# Patient Record
Sex: FEMALE | Race: WHITE | NOT HISPANIC OR LATINO | URBAN - METROPOLITAN AREA
[De-identification: names, ages, dates, MRNs, and addresses within clinical notes are randomized per-mention and may not be internally consistent; named-entity substitution may affect disease eponyms.]

---

## 2020-06-21 ENCOUNTER — EMERGENCY (EMERGENCY)
Facility: HOSPITAL | Age: 60
LOS: 1 days | Discharge: HOME | End: 2020-06-21
Attending: EMERGENCY MEDICINE
Payer: MEDICAID

## 2020-06-21 VITALS
RESPIRATION RATE: 16 BRPM | DIASTOLIC BLOOD PRESSURE: 71 MMHG | TEMPERATURE: 99 F | SYSTOLIC BLOOD PRESSURE: 117 MMHG | WEIGHT: 117.95 LBS | OXYGEN SATURATION: 97 % | HEART RATE: 88 BPM

## 2020-06-21 PROCEDURE — 99283 EMERGENCY DEPT VISIT LOW MDM: CPT | Mod: 25

## 2020-06-21 PROCEDURE — 99283 EMERGENCY DEPT VISIT LOW MDM: CPT

## 2020-06-21 PROCEDURE — 96372 THER/PROPH/DIAG INJ SC/IM: CPT

## 2020-06-21 RX ORDER — KETOROLAC TROMETHAMINE 30 MG/ML
30 SYRINGE (ML) INJECTION ONCE
Refills: 0 | Status: DISCONTINUED | OUTPATIENT
Start: 2020-06-21 | End: 2020-06-21

## 2020-06-21 RX ADMIN — Medication 30 MILLIGRAM(S): at 21:13

## 2020-06-21 NOTE — ED ADULT TRIAGE NOTE - CHIEF COMPLAINT QUOTE
I have bl knee pain , unable to walk , I have rheumatoid arthritis and hepatitis c, glaucoma, cataracts

## 2020-06-21 NOTE — ED PROVIDER NOTE - OBJECTIVE STATEMENT
59 y/o F patient with a significant PMHx of Hepatitis C, RA, Chronic right knee pain, and no significant PSHx presents to the ED with worsening pain for x1 week. Patient reports ambulating with a walker. Pt requesting Morphine to help with the pain. Patient currently not on pain meds. Pt reports having 2 beers earlier today. Patient is not a candidate for RA meds due to liver disease. Pt denies weakness, fever, shortness of breath, and any other complaints. NKDA.

## 2020-06-21 NOTE — ED PROVIDER NOTE - CLINICAL SUMMARY MEDICAL DECISION MAKING FREE TEXT BOX
Patient with chronic right knee pain due to RA. Pt asking several times for narcotics medication, educated on risk and factors associated with narcotic meds. Will provide Toradol, hot packs, and reassess.

## 2020-06-21 NOTE — ED PROVIDER NOTE - PATIENT PORTAL LINK FT
You can access the FollowMyHealth Patient Portal offered by [facility] by registering at the following website: [email or web address]. By joining [facility]’s FollowMyHealth portal, you will also be able to view your health information using other applications (apps) compatible with our system.

## 2020-07-05 ENCOUNTER — INPATIENT (INPATIENT)
Facility: HOSPITAL | Age: 60
LOS: 0 days | Discharge: HOME | DRG: 894 | End: 2020-07-06
Attending: HOSPITALIST | Admitting: HOSPITALIST
Payer: MEDICAID

## 2020-07-05 VITALS
HEIGHT: 65 IN | WEIGHT: 160.06 LBS | SYSTOLIC BLOOD PRESSURE: 163 MMHG | OXYGEN SATURATION: 98 % | RESPIRATION RATE: 20 BRPM | HEART RATE: 105 BPM | DIASTOLIC BLOOD PRESSURE: 87 MMHG

## 2020-07-05 DIAGNOSIS — F10.239 ALCOHOL DEPENDENCE WITH WITHDRAWAL, UNSPECIFIED: ICD-10-CM

## 2020-07-05 LAB
ALBUMIN SERPL ELPH-MCNC: 2.9 G/DL — LOW (ref 3.5–5)
ALP SERPL-CCNC: 175 U/L — HIGH (ref 40–120)
ALT FLD-CCNC: 27 U/L DA — SIGNIFICANT CHANGE UP (ref 10–60)
ANION GAP SERPL CALC-SCNC: 13 MMOL/L — SIGNIFICANT CHANGE UP (ref 5–17)
APAP SERPL-MCNC: <2 UG/ML — LOW (ref 10–30)
APPEARANCE UR: CLEAR — SIGNIFICANT CHANGE UP
AST SERPL-CCNC: 75 U/L — HIGH (ref 10–40)
BASOPHILS # BLD AUTO: 0.03 K/UL — SIGNIFICANT CHANGE UP (ref 0–0.2)
BASOPHILS NFR BLD AUTO: 0.4 % — SIGNIFICANT CHANGE UP (ref 0–2)
BILIRUB SERPL-MCNC: 1.8 MG/DL — HIGH (ref 0.2–1.2)
BILIRUB UR-MCNC: NEGATIVE — SIGNIFICANT CHANGE UP
BUN SERPL-MCNC: 4 MG/DL — LOW (ref 7–18)
CALCIUM SERPL-MCNC: 8.5 MG/DL — SIGNIFICANT CHANGE UP (ref 8.4–10.5)
CHLORIDE SERPL-SCNC: 99 MMOL/L — SIGNIFICANT CHANGE UP (ref 96–108)
CO2 SERPL-SCNC: 23 MMOL/L — SIGNIFICANT CHANGE UP (ref 22–31)
COLOR SPEC: YELLOW — SIGNIFICANT CHANGE UP
CREAT SERPL-MCNC: 0.78 MG/DL — SIGNIFICANT CHANGE UP (ref 0.5–1.3)
DIFF PNL FLD: NEGATIVE — SIGNIFICANT CHANGE UP
EOSINOPHIL # BLD AUTO: 0.07 K/UL — SIGNIFICANT CHANGE UP (ref 0–0.5)
EOSINOPHIL NFR BLD AUTO: 0.9 % — SIGNIFICANT CHANGE UP (ref 0–6)
ETHANOL SERPL-MCNC: <3 MG/DL — SIGNIFICANT CHANGE UP (ref 0–10)
GLUCOSE SERPL-MCNC: 120 MG/DL — HIGH (ref 70–99)
GLUCOSE UR QL: NEGATIVE — SIGNIFICANT CHANGE UP
HCT VFR BLD CALC: 39.8 % — SIGNIFICANT CHANGE UP (ref 34.5–45)
HGB BLD-MCNC: 13.6 G/DL — SIGNIFICANT CHANGE UP (ref 11.5–15.5)
IMM GRANULOCYTES NFR BLD AUTO: 0.4 % — SIGNIFICANT CHANGE UP (ref 0–1.5)
KETONES UR-MCNC: NEGATIVE — SIGNIFICANT CHANGE UP
LEUKOCYTE ESTERASE UR-ACNC: NEGATIVE — SIGNIFICANT CHANGE UP
LYMPHOCYTES # BLD AUTO: 2.81 K/UL — SIGNIFICANT CHANGE UP (ref 1–3.3)
LYMPHOCYTES # BLD AUTO: 36.6 % — SIGNIFICANT CHANGE UP (ref 13–44)
MCHC RBC-ENTMCNC: 33.1 PG — SIGNIFICANT CHANGE UP (ref 27–34)
MCHC RBC-ENTMCNC: 34.2 GM/DL — SIGNIFICANT CHANGE UP (ref 32–36)
MCV RBC AUTO: 96.8 FL — SIGNIFICANT CHANGE UP (ref 80–100)
MONOCYTES # BLD AUTO: 0.75 K/UL — SIGNIFICANT CHANGE UP (ref 0–0.9)
MONOCYTES NFR BLD AUTO: 9.8 % — SIGNIFICANT CHANGE UP (ref 2–14)
NEUTROPHILS # BLD AUTO: 3.99 K/UL — SIGNIFICANT CHANGE UP (ref 1.8–7.4)
NEUTROPHILS NFR BLD AUTO: 51.9 % — SIGNIFICANT CHANGE UP (ref 43–77)
NITRITE UR-MCNC: NEGATIVE — SIGNIFICANT CHANGE UP
NRBC # BLD: 0 /100 WBCS — SIGNIFICANT CHANGE UP (ref 0–0)
PH UR: 6.5 — SIGNIFICANT CHANGE UP (ref 5–8)
PLATELET # BLD AUTO: 110 K/UL — LOW (ref 150–400)
POTASSIUM SERPL-MCNC: 3.2 MMOL/L — LOW (ref 3.5–5.3)
POTASSIUM SERPL-SCNC: 3.2 MMOL/L — LOW (ref 3.5–5.3)
PROT SERPL-MCNC: 7.3 G/DL — SIGNIFICANT CHANGE UP (ref 6–8.3)
PROT UR-MCNC: NEGATIVE — SIGNIFICANT CHANGE UP
RBC # BLD: 4.11 M/UL — SIGNIFICANT CHANGE UP (ref 3.8–5.2)
RBC # FLD: 13.2 % — SIGNIFICANT CHANGE UP (ref 10.3–14.5)
SALICYLATES SERPL-MCNC: <1.7 MG/DL — LOW (ref 2.8–20)
SODIUM SERPL-SCNC: 135 MMOL/L — SIGNIFICANT CHANGE UP (ref 135–145)
SP GR SPEC: 1 — LOW (ref 1.01–1.02)
TSH SERPL-MCNC: 2.42 UU/ML — SIGNIFICANT CHANGE UP (ref 0.34–4.82)
UROBILINOGEN FLD QL: NEGATIVE — SIGNIFICANT CHANGE UP
WBC # BLD: 7.68 K/UL — SIGNIFICANT CHANGE UP (ref 3.8–10.5)
WBC # FLD AUTO: 7.68 K/UL — SIGNIFICANT CHANGE UP (ref 3.8–10.5)

## 2020-07-05 PROCEDURE — 93010 ELECTROCARDIOGRAM REPORT: CPT

## 2020-07-05 PROCEDURE — 71045 X-RAY EXAM CHEST 1 VIEW: CPT | Mod: 26

## 2020-07-05 PROCEDURE — 99285 EMERGENCY DEPT VISIT HI MDM: CPT

## 2020-07-05 PROCEDURE — 99223 1ST HOSP IP/OBS HIGH 75: CPT

## 2020-07-05 PROCEDURE — 70450 CT HEAD/BRAIN W/O DYE: CPT | Mod: 26

## 2020-07-05 RX ORDER — ACETAMINOPHEN 500 MG
650 TABLET ORAL EVERY 12 HOURS
Refills: 0 | Status: DISCONTINUED | OUTPATIENT
Start: 2020-07-05 | End: 2020-07-06

## 2020-07-05 RX ORDER — ENOXAPARIN SODIUM 100 MG/ML
40 INJECTION SUBCUTANEOUS DAILY
Refills: 0 | Status: DISCONTINUED | OUTPATIENT
Start: 2020-07-05 | End: 2020-07-06

## 2020-07-05 RX ORDER — ALPRAZOLAM 0.25 MG
0.5 TABLET ORAL THREE TIMES A DAY
Refills: 0 | Status: DISCONTINUED | OUTPATIENT
Start: 2020-07-05 | End: 2020-07-06

## 2020-07-05 RX ADMIN — Medication 2 MILLIGRAM(S): at 19:56

## 2020-07-05 RX ADMIN — Medication 650 MILLIGRAM(S): at 23:12

## 2020-07-05 NOTE — ED ADULT TRIAGE NOTE - CHIEF COMPLAINT QUOTE
As per EMS:  called for seizure episode earlier.  History of alcohol abuse  Unknown exposure to corona virus or to any person with flu-symptoms

## 2020-07-05 NOTE — ED PROVIDER NOTE - OBJECTIVE STATEMENT
60 year old female with PMHx of hepatitis C, rheumatoid arthritis right knee pain, and ETOH abuse and no significant PSHx presents to the ED via EMS after having seizure-like activity at home earlier today. Patient was reported to have two episodes of seizure-like activity while at home, and EMS reports an additional episode which was witnessed by them. Patient was noted to have positive tongue biting during the episodes.         Patient's  Everardo, who I have spoken to over the phone, relates that patient tried to stop drinking yesterday, after which she had an episode of seizure-like activity around 13:30, and then another episode around 15:30 today.  reports that patient has no history of seizures in the past. Patient's  states that he has no suspicion for trauma or drug use.   Allergies: Penicillin (rash)

## 2020-07-05 NOTE — ED ADULT NURSE NOTE - OBJECTIVE STATEMENT
Pt. c/o seizure today witnessed by . As per EMS pt. drinks alcohol every day with  and as per  pt. had a seizure in bed today and one en route to the ED.

## 2020-07-05 NOTE — ED PROVIDER NOTE - NEUROLOGICAL, MLM
Alert and oriented, no focal deficits, no motor or sensory deficits. Muscle strength and sensation normal. Alert and oriented, no focal deficits, no motor or sensory deficits. Muscle strength and sensation intact.

## 2020-07-05 NOTE — H&P ADULT - HISTORY OF PRESENT ILLNESS
Patient is a 60 year old female, lives at home with , with PMHx of Migraines, HTN, EtOH abuse, anxiety, on Xanax, Hepatitis C(Treated with interferon for 2 years late 1990s), Precancerous lesions of cervix presented after 2 episodes of convulsions. First episode occurred early afternoon prior to admission, patient was outside in the sun when  noticed patient to have a convulsion with associated confusion, weakness. Patient was then brought inside where symptoms lingered. At approximately 6pm prior to arrival in hospital patient had another episode in which patient had another episode of convulsion, this time with blood coming out of mouth.     To note, patient drinks approximately 6 beers a day for many years, On Xanax that ran out recently. Patient's last drink approximately 24 hours prior to arrival at hospital. As per , patient has been increasing confused in recent history with behavioral change.    GoC: Full Code Patient is a 60 year old female, lives at home with , with PMHx of Migraines, HTN, EtOH abuse, anxiety, on Xanax, Hepatitis C(Treated with interferon for 2 years late 1990s), after 2 episodes of convulsions. First episode occurred early afternoon prior to admission, patient was outside in the sun when  noticed patient to have a convulsion with associated confusion, weakness. Patient was then brought inside where symptoms lingered. At approximately 6pm prior to arrival in hospital patient had another episode in which patient had another episode of convulsion, this time with blood coming out of mouth.     To note, patient drinks approximately 6 beers a day for many years, On Xanax that ran out recently. Patient's last drink approximately 24 hours prior to arrival at hospital. As per , patient has been increasing confused in recent history with behavioral change.    GoC: Full Code

## 2020-07-05 NOTE — H&P ADULT - PROBLEM SELECTOR PLAN 1
New onset seizure due to benzo vs alcohol withdrawal  - Will obtain neuro consult, obtain EEG  - Ativan as needed for withdrawal. Continue with Xanax 1mg po qdaily  - Hold on loading dose of anti-epileptic medications unless patient has another seizure  - [facility] protocol

## 2020-07-05 NOTE — H&P ADULT - PROBLEM SELECTOR PLAN 8
IMPROVE VTE score: 0  Will manage with: Lovenox S/C     [ ] Previous VTE                                    3  [ ] Thrombophilia                                  2  [] Lower limb paralysis                        2  (unable to hold up >15 seconds)    [ ] Current Cancer (within 6 months)        2   [] Immobilization > 24 hrs                    1  [ ] ICU/CCU stay > 24 hrs                      1  [] Age > 60                                         1

## 2020-07-05 NOTE — ED PROVIDER NOTE - CLINICAL SUMMARY MEDICAL DECISION MAKING FREE TEXT BOX
60 year old female with new onset seizures after attempting to stop drinking. Concerning for ETOH withdrawal seizures. Patient given Ativan. Will obtain CT head, do toxicology screen, and will admit to hospital.

## 2020-07-05 NOTE — H&P ADULT - PROBLEM SELECTOR PLAN 3
Multiple year history of daily alcohol consumption.  Concerns for withdrawal, will start on CIWA protocol, ativan prn  - Concerns for Wernicke Encephalopathy, will start on thiamine 250 IV BID for 2-5 days, folate and multivitamin  - Tele for 24 hours  -  consult

## 2020-07-05 NOTE — H&P ADULT - PROBLEM SELECTOR PLAN 4
Hx of Hepatitis C treated from [id number] with interferon.  Elevated liver enzymes, possible, lesslikely alcoholic hepatitis  Will obtain PT/INR, hepatitis C antibody, US RUQ  - F/U Maddrey score for discriminant function

## 2020-07-05 NOTE — H&P ADULT - PROBLEM SELECTOR PLAN 2
Multiple possible causes, post-ictal vs drug induced vs alcohol withdrawal, vs Wernicke Enceph.  CT head negative, EtOH levels negative.

## 2020-07-05 NOTE — H&P ADULT - ASSESSMENT
GEN: NAD, lethargic, sleeping, s.p ativan   HEENT- normocephalic; moist mucous membranes, non bleeding tongue laceration noted   CHEST- scattered telangiectasias, hyperpigmentation of skin.  CVS- S1S2+ regular, no murmurs  LUNGS- clear to auscultation bilaterally, no wheezes, rhonci. rales  ABD: Soft , nontender, nondistended/obese, Bowel sounds are present  EXTREMITY: no calf tenderness, no cyanosis, no edema  NEURO: AAOx3; non focal neurologic exam; cranial nerves grossly intact, no asterixis, tremor  PSYCH: appears anxious, sedated  BACK: no swelling or mass;   SKIN: warm and dry.

## 2020-07-05 NOTE — H&P ADULT - NSHPSOCIALHISTORY_GEN_ALL_CORE
1/2 pack a day many years, Drinks 6 pack a day sometimes more, Norco for migraines, Xanax for anxiety. Hx of overdose on Ambien

## 2020-07-05 NOTE — H&P ADULT - ATTENDING COMMENTS
Vital Signs Last 24 Hrs  T(C): 37.6 (05 Jul 2020 19:32), Max: 37.6 (05 Jul 2020 19:32)  T(F): 99.7 (05 Jul 2020 19:32), Max: 99.7 (05 Jul 2020 19:32)  HR: 105 (05 Jul 2020 19:23) (105 - 105)  BP: 163/87 (05 Jul 2020 19:23) (163/87 - 163/87)  BP(mean): --  RR: 20 (05 Jul 2020 19:23) (20 - 20)  SpO2: 98% (05 Jul 2020 19:23) (98% - 98%)    #: [id number] Patient seen and examined ; case was discussed with the admitting resident  Unable to assess fam hx due to mental status.   ROS: as in the HPI; all other ROS negative  SH and family history as above    Vital Signs Last 24 Hrs  T(C): 37.6 (2020 19:32), Max: 37.6 (2020 19:32)  T(F): 99.7 (2020 19:32), Max: 99.7 (2020 19:32)  HR: 105 (2020 19:23) (105 - 105)  BP: 163/87 (2020 19:23) (163/87 - 163/87)  BP(mean): --  RR: 20 (2020 19:23) (20 - 20)  SpO2: 98% (2020 19:23) (98% - 98%)    GEN: NAD  HEENT- normocephalic; mouth moist, non bleeding tongue laceration noted   CHEST- scattered telangiectasias   CVS- S1S2+  LUNGS- clear to auscultation; no wheezing  ABD: Soft , nontender, nondistended, Bowel sounds are present  EXTREMITY: no calf tenderness, no cyanosis, no edema  NEURO: AAOx3; non focal neurologic exam; cranial nerves grossly intact, no asterixis, fine tremor b/l with outstretched hands.   PSYCH: mildly sedated   BACK: no swelling or mass;   VASCULAR: ++ distal peripheral pulses  SKIN: warm and dry.                         13.6   7.68  )-----------( 110      ( 2020 19:46 )             39.8     07-05    135  |  99  |  4<L>  ----------------------------<  120<H>  3.2<L>   |  23  |  0.78    Ca    8.5      2020 19:46    TPro  7.3  /  Alb  2.9<L>  /  TBili  1.8<H>  /  DBili  x   /  AST  75<H>  /  ALT  27  /  AlkPhos  175<H>  07-05  Urinalysis Basic - ( 2020 22:13 )  Color: Yellow / Appearance: Clear / S.005 / pH: x  Gluc: x / Ketone: Negative  / Bili: Negative / Urobili: Negative   Blood: x / Protein: Negative / Nitrite: Negative   Leuk Esterase: Negative / RBC: x / WBC x   Sq Epi: x / Non Sq Epi: x / Bacteria: x  BNP:   MEDICATIONS  (STANDING):  MEDICATIONS  (PRN):  acetaminophen   Tablet .. 650 milliGRAM(s) Oral every 12 hours PRN Mild Pain (1 - 3)  CXR reviewed  EKG Reviewed    59 y/o F with HCV ?cirrhosis, newly diagnosed COPD not on inhalers, chronic migraines and anxiety on Xanax admitted with new onset witnessed seizure. Seizure witnessed with generalized tonic clonic movements and had another episode with EMS. Hx somewhat limited due to patient's mental status- medication effect vs postictal state. Alcohol consumption unclear, states she drinks every day 1-2 beers with last beer last night  but difficult to obtain further information about chronic consumption. Denies prev hx of seizures, hospitalization for EtOH. Of note ran out of Xanax 2 days ago.     1. New onset seizure- suspect due to BZD withdrawal, possible EtOH. Prev Rx for Xanax 1mg 23 days supply quantity 45 filled on 2020. Received ativan IV in ED, will continue Xanax for now 1mg daily as she is mildly sedated on exam, and add prn ativan for seizure like activity or elevated CIWA score. Will hold Keppra for now but give loading dose if has another seizure, routine neuro checks, routine EEG, CT head unremarkable. Appreciate neurology consultation.   2. Toxic metabolic encephalopathy- due to medication effect vs post ictal state, possible alcohol withdrawal- EtOH level negative, CT head negative as above.  3. Alcohol dependence- suspected with possible withdrawal- will attempt to obtain collateral information from . Consider DC tele if no events in 24 h, MVI, thiamine supp, SW consult for resources, CIWA monitoring.  4. Elevated hepatic enzymes- check PT to calculate DF, low clinical suspicion for alcoholic hepatitis, check HCV viral load, hepatic US   5. Chronic HCV- did ribavirn in the mid 1990s, denies prev bx. Recommended to f/u outpatient   6. Chronic BZD use   7. Hypokalemia- replace and recheck, check Mg   8. Chronic migraines- denies pain now, had Rx for Norco filled on 20, will hold given increased risk of rebound headaches on narcotics.   9. COPD- recently diagnosed, denies cough, sputum production or dyspnea. Needs to f/u outpatient for treatment as inhalers prescribed were not covered by insurance.   10. Tobacco abuse- counseled on cessation, patch offered and declines.       Plan of care discussed with patient ;  all questions and concerns were addressed.  Discussed with Patient's family  I stop reviewed #: [id number] Patient seen and examined ; case was discussed with the admitting resident  Unable to assess fam hx due to mental status.   ROS: as in the HPI; all other ROS negative  SH and family history as above    Vital Signs Last 24 Hrs  T(C): 37.6 (2020 19:32), Max: 37.6 (2020 19:32)  T(F): 99.7 (2020 19:32), Max: 99.7 (2020 19:32)  HR: 105 (2020 19:23) (105 - 105)  BP: 163/87 (2020 19:23) (163/87 - 163/87)  BP(mean): --  RR: 20 (2020 19:23) (20 - 20)  SpO2: 98% (2020 19:23) (98% - 98%)    GEN: NAD  HEENT- normocephalic; mouth moist, non bleeding tongue laceration noted   CHEST- scattered telangiectasias   CVS- S1S2+  LUNGS- clear to auscultation; no wheezing  ABD: Soft , nontender, nondistended, Bowel sounds are present  EXTREMITY: no calf tenderness, no cyanosis, no edema  NEURO: AAOx3; non focal neurologic exam; cranial nerves grossly intact, no asterixis, fine tremor b/l with outstretched hands.   PSYCH: mildly sedated   BACK: no swelling or mass;   VASCULAR: ++ distal peripheral pulses  SKIN: warm and dry.                         13.6   7.68  )-----------( 110      ( 2020 19:46 )             39.8     07-05    135  |  99  |  4<L>  ----------------------------<  120<H>  3.2<L>   |  23  |  0.78    Ca    8.5      2020 19:46    TPro  7.3  /  Alb  2.9<L>  /  TBili  1.8<H>  /  DBili  x   /  AST  75<H>  /  ALT  27  /  AlkPhos  175<H>  07-05  Urinalysis Basic - ( 2020 22:13 )  Color: Yellow / Appearance: Clear / S.005 / pH: x  Gluc: x / Ketone: Negative  / Bili: Negative / Urobili: Negative   Blood: x / Protein: Negative / Nitrite: Negative   Leuk Esterase: Negative / RBC: x / WBC x   Sq Epi: x / Non Sq Epi: x / Bacteria: x  BNP:   MEDICATIONS  (STANDING):  MEDICATIONS  (PRN):  acetaminophen   Tablet .. 650 milliGRAM(s) Oral every 12 hours PRN Mild Pain (1 - 3)  CXR reviewed  EKG Reviewed    61 y/o F with HCV ?cirrhosis, newly diagnosed COPD not on inhalers, chronic migraines and anxiety on Xanax admitted with new onset witnessed seizure. Seizure witnessed with generalized tonic clonic movements and had another episode with EMS. Hx somewhat limited due to patient's mental status- medication effect vs postictal state. Alcohol consumption unclear, states she drinks every day 1-2 beers with last beer last night  but difficult to obtain further information about chronic consumption. Denies prev hx of seizures, hospitalization for EtOH. Of note ran out of Xanax 2 days ago. Addendum- collateral information obtained from : patient has been drinking heavily for many years at least a six pack of beer a day.     1. New onset seizure- suspect due to BZD withdrawal, suspected EtOH. Prev Rx for Xanax 1mg 23 days supply quantity 45 filled on 2020. Received ativan IV in ED, will continue Xanax for now 1mg daily as she is mildly sedated on exam, and add prn ativan for seizure like activity or elevated CIWA score. Will hold Keppra for now but give loading dose if has another seizure, routine neuro checks, routine EEG, CT head unremarkable. Appreciate neurology consultation.   2. Toxic metabolic encephalopathy- due to medication effect vs post ictal state, possible alcohol withdrawal- EtOH level negative, CT head negative as above.  3. Alcohol dependence- suspected with possible withdrawal- will attempt to obtain collateral information from . Consider DC tele if no events in 24 h, MVI, thiamine supp, SW consult for resources, CIWA monitoring.  4. Elevated hepatic enzymes- check PT to calculate DF, low clinical suspicion for alcoholic hepatitis, check HCV viral load, hepatic US   5. Chronic HCV- did ribavirn in the mid , denies prev bx. Recommended to f/u outpatient   6. Chronic BZD use   7. Hypokalemia- replace and recheck, check Mg   8. Chronic migraines- denies pain now, had Rx for Norco filled on 20, will hold given increased risk of rebound headaches on narcotics.   9. COPD- recently diagnosed, denies cough, sputum production or dyspnea. Needs to f/u outpatient for treatment as inhalers prescribed were not covered by insurance.   10. Tobacco abuse- counseled on cessation, patch offered and declines.       Plan of care discussed with patient ;  all questions and concerns were addressed.  Discussed with Patient's family  I stop reviewed #: [id number]

## 2020-07-05 NOTE — ED ADULT NURSE NOTE - NSIMPLEMENTINTERV_GEN_ALL_ED
Implemented All Fall Risk Interventions:  [place] to call system. Call bell, personal items and telephone within reach. Instruct patient to call for assistance. Room bathroom lighting operational. Non-slip footwear when patient is off stretcher. Physically safe environment: no spills, clutter or unnecessary equipment. Stretcher in lowest position, wheels locked, appropriate side rails in place. Provide visual cue, wrist band, yellow gown, etc. Monitor gait and stability. Monitor for mental status changes and reorient to person, place, and time. Review medications for side effects contributing to fall risk. Reinforce activity limits and safety measures with patient and family.

## 2020-07-06 VITALS
HEART RATE: 88 BPM | RESPIRATION RATE: 19 BRPM | DIASTOLIC BLOOD PRESSURE: 69 MMHG | OXYGEN SATURATION: 97 % | SYSTOLIC BLOOD PRESSURE: 119 MMHG | TEMPERATURE: 99 F

## 2020-07-06 DIAGNOSIS — Z29.9 ENCOUNTER FOR PROPHYLACTIC MEASURES, UNSPECIFIED: ICD-10-CM

## 2020-07-06 DIAGNOSIS — Z02.9 ENCOUNTER FOR ADMINISTRATIVE EXAMINATIONS, UNSPECIFIED: ICD-10-CM

## 2020-07-06 DIAGNOSIS — G92 TOXIC ENCEPHALOPATHY: ICD-10-CM

## 2020-07-06 DIAGNOSIS — Z71.89 OTHER SPECIFIED COUNSELING: ICD-10-CM

## 2020-07-06 DIAGNOSIS — F13.20 SEDATIVE, HYPNOTIC OR ANXIOLYTIC DEPENDENCE, UNCOMPLICATED: ICD-10-CM

## 2020-07-06 DIAGNOSIS — F10.20 ALCOHOL DEPENDENCE, UNCOMPLICATED: ICD-10-CM

## 2020-07-06 DIAGNOSIS — R79.89 OTHER SPECIFIED ABNORMAL FINDINGS OF BLOOD CHEMISTRY: ICD-10-CM

## 2020-07-06 DIAGNOSIS — J44.9 CHRONIC OBSTRUCTIVE PULMONARY DISEASE, UNSPECIFIED: ICD-10-CM

## 2020-07-06 DIAGNOSIS — G43.909 MIGRAINE, UNSPECIFIED, NOT INTRACTABLE, WITHOUT STATUS MIGRAINOSUS: ICD-10-CM

## 2020-07-06 DIAGNOSIS — R56.9 UNSPECIFIED CONVULSIONS: ICD-10-CM

## 2020-07-06 DIAGNOSIS — B18.2 CHRONIC VIRAL HEPATITIS C: ICD-10-CM

## 2020-07-06 PROBLEM — M25.561 PAIN IN RIGHT KNEE: Chronic | Status: ACTIVE | Noted: 2020-06-21

## 2020-07-06 PROBLEM — M06.9 RHEUMATOID ARTHRITIS, UNSPECIFIED: Chronic | Status: ACTIVE | Noted: 2020-06-21

## 2020-07-06 PROBLEM — B19.20 UNSPECIFIED VIRAL HEPATITIS C WITHOUT HEPATIC COMA: Chronic | Status: ACTIVE | Noted: 2020-06-21

## 2020-07-06 LAB
24R-OH-CALCIDIOL SERPL-MCNC: 15.9 NG/ML — LOW (ref 30–80)
A1C WITH ESTIMATED AVERAGE GLUCOSE RESULT: 4.7 % — SIGNIFICANT CHANGE UP (ref 4–5.6)
ALBUMIN SERPL ELPH-MCNC: 2.3 G/DL — LOW (ref 3.5–5)
ALP SERPL-CCNC: 130 U/L — HIGH (ref 40–120)
ALT FLD-CCNC: 22 U/L DA — SIGNIFICANT CHANGE UP (ref 10–60)
ANION GAP SERPL CALC-SCNC: 6 MMOL/L — SIGNIFICANT CHANGE UP (ref 5–17)
APTT BLD: 39.1 SEC — HIGH (ref 27.5–35.5)
AST SERPL-CCNC: 57 U/L — HIGH (ref 10–40)
BILIRUB SERPL-MCNC: 2.1 MG/DL — HIGH (ref 0.2–1.2)
BUN SERPL-MCNC: 4 MG/DL — LOW (ref 7–18)
CALCIUM SERPL-MCNC: 7.9 MG/DL — LOW (ref 8.4–10.5)
CHLORIDE SERPL-SCNC: 105 MMOL/L — SIGNIFICANT CHANGE UP (ref 96–108)
CHOLEST SERPL-MCNC: 93 MG/DL — SIGNIFICANT CHANGE UP (ref 10–199)
CO2 SERPL-SCNC: 29 MMOL/L — SIGNIFICANT CHANGE UP (ref 22–31)
CREAT SERPL-MCNC: 0.54 MG/DL — SIGNIFICANT CHANGE UP (ref 0.5–1.3)
ESTIMATED AVERAGE GLUCOSE: 88 MG/DL — SIGNIFICANT CHANGE UP (ref 68–114)
FOLATE SERPL-MCNC: 6.3 NG/ML — SIGNIFICANT CHANGE UP
GLUCOSE SERPL-MCNC: 94 MG/DL — SIGNIFICANT CHANGE UP (ref 70–99)
HCT VFR BLD CALC: 31.6 % — LOW (ref 34.5–45)
HDLC SERPL-MCNC: 51 MG/DL — SIGNIFICANT CHANGE UP
HGB BLD-MCNC: 10.7 G/DL — LOW (ref 11.5–15.5)
INR BLD: 1.26 RATIO — HIGH (ref 0.88–1.16)
LIPID PNL WITH DIRECT LDL SERPL: 32 MG/DL — SIGNIFICANT CHANGE UP
MAGNESIUM SERPL-MCNC: 1.8 MG/DL — SIGNIFICANT CHANGE UP (ref 1.6–2.6)
MCHC RBC-ENTMCNC: 33.6 PG — SIGNIFICANT CHANGE UP (ref 27–34)
MCHC RBC-ENTMCNC: 33.9 GM/DL — SIGNIFICANT CHANGE UP (ref 32–36)
MCV RBC AUTO: 99.4 FL — SIGNIFICANT CHANGE UP (ref 80–100)
NRBC # BLD: 0 /100 WBCS — SIGNIFICANT CHANGE UP (ref 0–0)
PHOSPHATE SERPL-MCNC: 2.8 MG/DL — SIGNIFICANT CHANGE UP (ref 2.5–4.5)
PLATELET # BLD AUTO: 69 K/UL — LOW (ref 150–400)
POTASSIUM SERPL-MCNC: 3.6 MMOL/L — SIGNIFICANT CHANGE UP (ref 3.5–5.3)
POTASSIUM SERPL-SCNC: 3.6 MMOL/L — SIGNIFICANT CHANGE UP (ref 3.5–5.3)
PROT SERPL-MCNC: 5.8 G/DL — LOW (ref 6–8.3)
PROTHROM AB SERPL-ACNC: 14.6 SEC — HIGH (ref 10.6–13.6)
RBC # BLD: 3.18 M/UL — LOW (ref 3.8–5.2)
RBC # FLD: 13.1 % — SIGNIFICANT CHANGE UP (ref 10.3–14.5)
SARS-COV-2 RNA SPEC QL NAA+PROBE: SIGNIFICANT CHANGE UP
SODIUM SERPL-SCNC: 140 MMOL/L — SIGNIFICANT CHANGE UP (ref 135–145)
TOTAL CHOLESTEROL/HDL RATIO MEASUREMENT: 1.8 RATIO — LOW (ref 3.3–7.1)
TRIGL SERPL-MCNC: 52 MG/DL — SIGNIFICANT CHANGE UP (ref 10–149)
TROPONIN I SERPL-MCNC: 0.11 NG/ML — HIGH (ref 0–0.04)
TROPONIN I SERPL-MCNC: 0.23 NG/ML — HIGH (ref 0–0.04)
VIT B12 SERPL-MCNC: 1028 PG/ML — SIGNIFICANT CHANGE UP (ref 232–1245)
WBC # BLD: 2.59 K/UL — LOW (ref 3.8–10.5)
WBC # FLD AUTO: 2.59 K/UL — LOW (ref 3.8–10.5)

## 2020-07-06 PROCEDURE — 93005 ELECTROCARDIOGRAM TRACING: CPT

## 2020-07-06 PROCEDURE — 84100 ASSAY OF PHOSPHORUS: CPT

## 2020-07-06 PROCEDURE — 71045 X-RAY EXAM CHEST 1 VIEW: CPT

## 2020-07-06 PROCEDURE — 80053 COMPREHEN METABOLIC PANEL: CPT

## 2020-07-06 PROCEDURE — 96374 THER/PROPH/DIAG INJ IV PUSH: CPT

## 2020-07-06 PROCEDURE — 80307 DRUG TEST PRSMV CHEM ANLYZR: CPT

## 2020-07-06 PROCEDURE — 82607 VITAMIN B-12: CPT

## 2020-07-06 PROCEDURE — 84484 ASSAY OF TROPONIN QUANT: CPT

## 2020-07-06 PROCEDURE — 82962 GLUCOSE BLOOD TEST: CPT

## 2020-07-06 PROCEDURE — 83735 ASSAY OF MAGNESIUM: CPT

## 2020-07-06 PROCEDURE — 99285 EMERGENCY DEPT VISIT HI MDM: CPT

## 2020-07-06 PROCEDURE — 99233 SBSQ HOSP IP/OBS HIGH 50: CPT

## 2020-07-06 PROCEDURE — U0003: CPT

## 2020-07-06 PROCEDURE — 84443 ASSAY THYROID STIM HORMONE: CPT

## 2020-07-06 PROCEDURE — 81003 URINALYSIS AUTO W/O SCOPE: CPT

## 2020-07-06 PROCEDURE — 36415 COLL VENOUS BLD VENIPUNCTURE: CPT

## 2020-07-06 PROCEDURE — 85610 PROTHROMBIN TIME: CPT

## 2020-07-06 PROCEDURE — 80061 LIPID PANEL: CPT

## 2020-07-06 PROCEDURE — 82746 ASSAY OF FOLIC ACID SERUM: CPT

## 2020-07-06 PROCEDURE — 85730 THROMBOPLASTIN TIME PARTIAL: CPT

## 2020-07-06 PROCEDURE — 83036 HEMOGLOBIN GLYCOSYLATED A1C: CPT

## 2020-07-06 PROCEDURE — 70450 CT HEAD/BRAIN W/O DYE: CPT

## 2020-07-06 PROCEDURE — 82306 VITAMIN D 25 HYDROXY: CPT

## 2020-07-06 PROCEDURE — 85027 COMPLETE CBC AUTOMATED: CPT

## 2020-07-06 RX ORDER — FOLIC ACID 0.8 MG
1 TABLET ORAL DAILY
Refills: 0 | Status: DISCONTINUED | OUTPATIENT
Start: 2020-07-06 | End: 2020-07-06

## 2020-07-06 RX ORDER — HYDROCHLOROTHIAZIDE 25 MG
12.5 TABLET ORAL DAILY
Refills: 0 | Status: DISCONTINUED | OUTPATIENT
Start: 2020-07-06 | End: 2020-07-06

## 2020-07-06 RX ORDER — NICOTINE POLACRILEX 2 MG
1 GUM BUCCAL DAILY
Refills: 0 | Status: DISCONTINUED | OUTPATIENT
Start: 2020-07-06 | End: 2020-07-06

## 2020-07-06 RX ORDER — POTASSIUM CHLORIDE 20 MEQ
40 PACKET (EA) ORAL EVERY 4 HOURS
Refills: 0 | Status: COMPLETED | OUTPATIENT
Start: 2020-07-06 | End: 2020-07-06

## 2020-07-06 RX ORDER — ACETAMINOPHEN 500 MG
1000 TABLET ORAL EVERY 6 HOURS
Refills: 0 | Status: DISCONTINUED | OUTPATIENT
Start: 2020-07-06 | End: 2020-07-06

## 2020-07-06 RX ORDER — OXYCODONE AND ACETAMINOPHEN 5; 325 MG/1; MG/1
1 TABLET ORAL EVERY 6 HOURS
Refills: 0 | Status: DISCONTINUED | OUTPATIENT
Start: 2020-07-06 | End: 2020-07-06

## 2020-07-06 RX ORDER — ALPRAZOLAM 0.25 MG
1 TABLET ORAL DAILY
Refills: 0 | Status: DISCONTINUED | OUTPATIENT
Start: 2020-07-06 | End: 2020-07-06

## 2020-07-06 RX ORDER — IBUPROFEN 200 MG
600 TABLET ORAL EVERY 6 HOURS
Refills: 0 | Status: DISCONTINUED | OUTPATIENT
Start: 2020-07-06 | End: 2020-07-06

## 2020-07-06 RX ORDER — THIAMINE MONONITRATE (VIT B1) 100 MG
1 TABLET ORAL
Qty: 30 | Refills: 0
Start: 2020-07-06 | End: 2020-08-04

## 2020-07-06 RX ORDER — FLUTICASONE PROPIONATE 50 MCG
1 SPRAY, SUSPENSION NASAL
Qty: 0 | Refills: 0 | DISCHARGE

## 2020-07-06 RX ORDER — KETOTIFEN FUMARATE 0.34 MG/ML
1 SOLUTION OPHTHALMIC EVERY 8 HOURS
Refills: 0 | Status: DISCONTINUED | OUTPATIENT
Start: 2020-07-06 | End: 2020-07-06

## 2020-07-06 RX ORDER — THIAMINE MONONITRATE (VIT B1) 100 MG
250 TABLET ORAL THREE TIMES A DAY
Refills: 0 | Status: DISCONTINUED | OUTPATIENT
Start: 2020-07-06 | End: 2020-07-06

## 2020-07-06 RX ORDER — MONTELUKAST 4 MG/1
10 TABLET, CHEWABLE ORAL AT BEDTIME
Refills: 0 | Status: DISCONTINUED | OUTPATIENT
Start: 2020-07-06 | End: 2020-07-06

## 2020-07-06 RX ORDER — FOLIC ACID 0.8 MG
1 TABLET ORAL
Qty: 30 | Refills: 0
Start: 2020-07-06 | End: 2020-08-04

## 2020-07-06 RX ORDER — ACETAMINOPHEN 500 MG
650 TABLET ORAL EVERY 4 HOURS
Refills: 0 | Status: DISCONTINUED | OUTPATIENT
Start: 2020-07-06 | End: 2020-07-06

## 2020-07-06 RX ADMIN — Medication 1 MILLIGRAM(S): at 12:02

## 2020-07-06 RX ADMIN — Medication 102.5 MILLIGRAM(S): at 05:17

## 2020-07-06 RX ADMIN — Medication 1 PATCH: at 12:03

## 2020-07-06 RX ADMIN — ENOXAPARIN SODIUM 40 MILLIGRAM(S): 100 INJECTION SUBCUTANEOUS at 12:04

## 2020-07-06 RX ADMIN — Medication 0.5 MILLIGRAM(S): at 00:14

## 2020-07-06 RX ADMIN — Medication 2 MILLIGRAM(S): at 04:33

## 2020-07-06 RX ADMIN — Medication 1 DROP(S): at 12:04

## 2020-07-06 RX ADMIN — Medication 1 DROP(S): at 05:18

## 2020-07-06 RX ADMIN — KETOTIFEN FUMARATE 1 DROP(S): 0.34 SOLUTION OPHTHALMIC at 05:17

## 2020-07-06 RX ADMIN — OXYCODONE AND ACETAMINOPHEN 1 TABLET(S): 5; 325 TABLET ORAL at 09:53

## 2020-07-06 RX ADMIN — Medication 40 MILLIEQUIVALENT(S): at 09:35

## 2020-07-06 RX ADMIN — Medication 12.5 MILLIGRAM(S): at 05:18

## 2020-07-06 RX ADMIN — Medication 40 MILLIEQUIVALENT(S): at 04:33

## 2020-07-06 NOTE — PROGRESS NOTE ADULT - PROBLEM SELECTOR PLAN 1
New onset seizure due to benzo vs alcohol withdrawal  Continue with CIWA   Continue with Xanax  Encephalopathy likely secondary to postical period, CT noted above  Dr. Fang (Neulogy) consulted

## 2020-07-06 NOTE — DISCHARGE NOTE PROVIDER - ATTENDING COMMENTS
MEDICAL ATTENDING DISCHARGE NOTE            Vital Signs Last 24 Hrs    T(C): 37.4 (06 Jul 2020 16:15), Max: 37.6 (05 Jul 2020 19:32)    T(F): 99.3 (06 Jul 2020 16:15), Max: 99.7 (05 Jul 2020 19:32)    HR: 88 (06 Jul 2020 16:15) (75 - 105)    BP: 119/69 (06 Jul 2020 16:15) (114/63 - 163/87)    BP(mean): --    RR: 19 (06 Jul 2020 16:15) (18 - 20)    SpO2: 97% (06 Jul 2020 16:15) (97% - 100%)        _Pt wants to sign out AMA    Pt is seen and evaluated by bedside. Awake, alert x 3. Still has mild tremors.     Understands well why she is admitted but wants to sign out AMA. I have made her aware that she can have another withdrawel seizure which can even be fatal - She shows verbal understanding of this but states she still wants to go home and will follow with PCP Dr. Jomar perez (will make an appointment). I offered ativan but will have to wait for few hours before she can be discharged as unclear if she will drive home. She has refused. I asked for other measure that will allow her to stay. She states she "just wants to go home at this time". Will contact family and make them aware.     Plan of care was discussed with patient; all questions and concerns were addressed and care was aligned with patient's wishes.        will provide prescription for B12, folate, multivitamins. Counseling provided. AA information given and explained . Family made aware of pts AMA status

## 2020-07-06 NOTE — PROGRESS NOTE ADULT - ATTENDING COMMENTS
MEDICAL ATTENDING NOTE    Patient is a 60y old  Female who presents with a chief complaint of Convulsions (06 Jul 2020 12:36)    INTERVAL HPI/OVERNIGHT EVENTS: offers no new complaints today    MEDICATIONS  (STANDING):  artificial  tears Solution 1 Drop(s) Both EYES four times a day  enoxaparin Injectable 40 milliGRAM(s) SubCutaneous daily  folic acid 1 milliGRAM(s) Oral daily  hydrochlorothiazide 12.5 milliGRAM(s) Oral daily  ketotifen 0.025% Ophthalmic Solution 1 Drop(s) Both EYES every 8 hours  montelukast 10 milliGRAM(s) Oral at bedtime  multivitamin 1 Tablet(s) Oral daily  nicotine -  14 mG/24Hr(s) Patch 1 patch Transdermal daily  thiamine IVPB 250 milliGRAM(s) IV Intermittent three times a day    MEDICATIONS  (PRN):  acetaminophen   Tablet .. 650 milliGRAM(s) Oral every 4 hours PRN Mild Pain (1 - 3)  ALPRAZolam 1 milliGRAM(s) Oral daily PRN anxiety  ibuprofen  Tablet. 600 milliGRAM(s) Oral every 6 hours PRN Moderate Pain (4 - 6)  LORazepam   Injectable 2 milliGRAM(s) IV Push every 2 hours PRN CIWA>7, Seizure activity  oxycodone    5 mG/acetaminophen 325 mG 1 Tablet(s) Oral every 6 hours PRN Severe Pain (7 - 10)    ____________  ----------------------------------------------------------------------------------  REVIEW OF SYSTEMS: negative    ICU Vital Signs Last 24 Hrs  T(C): 36.9 (06 Jul 2020 14:53), Max: 37.6 (05 Jul 2020 19:32)  T(F): 98.4 (06 Jul 2020 14:53), Max: 99.7 (05 Jul 2020 19:32)  HR: 82 (06 Jul 2020 14:53) (75 - 105)  BP: 128/66 (06 Jul 2020 14:53) (114/63 - 163/87)  BP(mean): --  ABP: --  ABP(mean): --  RR: 18 (06 Jul 2020 14:53) (18 - 20)  SpO2: 97% (06 Jul 2020 14:53) (97% - 100%)    _________________  PHYSICAL EXAM:  ---------------------------  NAD; Normocephalic;   LUNGS - no wheezing  HEART: S1 S2+   ABDOMEN: Soft, Nontender, non distended  EXTREMITIES: no cyanosis; no edema  NERVOUS SYSTEM:  Awake and alert; no focal neuro deficits appreciated    _________________________________________________  LABS:                        10.7   2.59  )-----------( 69       ( 06 Jul 2020 06:40 )             31.6     07-06    140  |  105  |  4<L>  ----------------------------<  94  3.6   |  29  |  0.54    Ca    7.9<L>      06 Jul 2020 06:40  Phos  2.8     07-06  Mg     1.8     07-06    TPro  5.8<L>  /  Alb  2.3<L>  /  TBili  2.1<H>  /  DBili  x   /  AST  57<H>  /  ALT  22  /  AlkPhos  130<H>  07-06    PT/INR - ( 06 Jul 2020 06:40 )   PT: 14.6 sec;   INR: 1.26 ratio         PTT - ( 06 Jul 2020 06:40 )  PTT:39.1 sec  Urinalysis Basic - ( 05 Jul 2020 22:13 )    CAPILLARY BLOOD GLUCOSE  POCT Blood Glucose.: 126 mg/dL (05 Jul 2020 19:23)    Pt is seen and evaluated by bedside. Awake, alert x 3. Still has mild tremors.   Understands well why she is admitted but wants to sign out AMA. I have made her aware that she can have another withdrawel seizure which can even be fatal - She shows verbal understanding of this but states she still wants to go home and will follow with PCP Dr. Jomar perez (will make an appointment). I offered ativan but will have to wait for few hours before she can be discharged as unclear if she will drive home. She has refused. I asked for other measure that will allow her to stay. She states she "just wants to go home at this time". Will contact family and make them aware.   Plan of care was discussed with patient; all questions and concerns were addressed and care was aligned with patient's wishes.    will provide prescription for B12, folate, multivitamins. Counseling provided. AA information given and explained

## 2020-07-06 NOTE — CONSULT NOTE ADULT - ATTENDING COMMENTS
I personally interviewed, examined, and participated in the care of this patient, on rounds 7/6/20 with the NP Marcy.  I edited the above where appropriate.      On examination I note in addition:    Thin build with poor muscularity.  Symmetrically weak all over.  Antalgic gait (R knee pain).  Stable stance; negative Romberg.    Distended prominent abdomen (ascites by visual inspection).        On lab test review I note in particular:     Tot prot/alb  5.8/2.3  High serum B12 1028, with MCV at upper limit or normal (99).    INR  1.26  Alk Phos 130; AST/ALT 22/57 I personally interviewed, examined, and participated in the care of this patient, on rounds 7/6/20 with the NP Marcy.  I edited the above where appropriate.      Additional History:     Pt avers that she did not loose consciousness during the episodes of tremoring (?seizures).  She says her eyeys were jumping (because that's how she saw the world), her arms were shaking, she heard and understood, but couldn't enunciate because her lips were also shaking.       On examination I note in addition:    Thin build with poor muscularity.  Symmetrically weak all over.  Antalgic gait (R knee pain).  Stable stance; negative Romberg.    Distended prominent abdomen (ascites by visual inspection).  Mild tremor on attaining target during FNF testing.      On lab test review I note in particular:     Tot prot/alb  5.8/2.3  High serum B12 1028, with MCV at upper limit or normal (99).        She has not been given vitamin B12 in hospital except for the small amount present in MVI.   Folate 6.3  INR  1.26  Alk Phos 130; AST/ALT 22/57      DISCUSSION    It is clear that she quickly recovered from each episode of seizure or seizure-like activity, apparently without, or with only a brief, period of post-ictal depression of consciousness.   Likely had withdrawal (from EtOH and/or benzodiazepine) seizures.  Alternatively had incipient delirium tremens, aborted by hospital treatment.  In addition, given her hepatic insufficiency and poor nutritional status (probably inadequate glycogen stores) she may have been hypoglycemic.  In any event, the seizures (if that is what they were) were provoked.  This clinical picture is not compatible with a primary seizure disorder.      The high B12 level could be a false elevation (due to presence of intrinsic factor antibodies, which lead to false results with the assay method used to indirectly infer B12 level).        RECOMMENDATIONS    Management of withdrawal state.    Methylmalonic acid (MMA), homocysteine (Hcy), GGT, NH3 levels.    After blood drawn for levels, cyanocobalamin 1000mcg IM.  Further administration of B12, folate depending on MMA and Hcy results. I personally interviewed, examined, and participated in the care of this patient, on rounds 7/6/20 with the NP Marcy.  I edited the above where appropriate.      Additional History:     Pt avers that she did not loose consciousness during the episodes of tremoring (?seizures).  She says her eyeys were jumping (because that's how she saw the world), her arms were shaking, she heard and understood, but couldn't enunciate because her lips were also shaking.   Denies she had DTs in the past (is familiar with them).    On examination I note in addition:    Thin build with poor muscularity.  Symmetrically weak all over.  Antalgic gait (R knee pain).  Stable stance; negative Romberg.    Distended prominent abdomen (ascites by visual inspection).  Mild tremor on attaining target during FNF testing.      On lab test review I note in particular:     Tot prot/alb  5.8/2.3  High serum B12 1028, with MCV at upper limit or normal (99).        She has not been given vitamin B12 in hospital except for the small amount present in MVI.   Folate 6.3  INR  1.26  Alk Phos 130; AST/ALT 22/57      DISCUSSION    It is clear that she quickly recovered from each episode of seizure or seizure-like activity, apparently without, or with only a brief, period of post-ictal depression of consciousness.   Likely had withdrawal (from EtOH and/or benzodiazepine) seizures.  Alternatively had incipient delirium tremens, aborted by hospital treatment.  In addition, given her hepatic insufficiency and poor nutritional status (probably inadequate glycogen stores) she may have been hypoglycemic.  In any event, the seizures (if that is what they were) were provoked.  This clinical picture is not compatible with a primary seizure disorder.      The high B12 level could be a false elevation (due to presence of intrinsic factor antibodies, which lead to false results with the assay method used to indirectly infer B12 level).        RECOMMENDATIONS    Management of withdrawal state.    Methylmalonic acid (MMA), homocysteine (Hcy), GGT, NH3 levels.    After blood drawn for levels, cyanocobalamin 1000mcg IM.  Further administration of B12, folate depending on MMA and Hcy results.

## 2020-07-06 NOTE — DISCHARGE NOTE PROVIDER - HOSPITAL COURSE
HPI:    Patient is a 60 year old female, lives at home with , with PMHx of Migraines, HTN, EtOH abuse, anxiety, on Xanax, Hepatitis C(Treated with interferon for 2 years late 1990s), after 2 episodes of convulsions. First episode occurred early afternoon prior to admission, patient was outside in the sun when  noticed patient to have a convulsion with associated confusion, weakness. Patient was then brought inside where symptoms lingered. At approximately 6pm prior to arrival in hospital patient had another episode in which patient had another episode of convulsion, this time with blood coming out of mouth.         To note, patient drinks approximately 6 beers a day for many years, On Xanax that ran out recently. Patient's last drink approximately 24 hours prior to arrival at hospital. As per , patient has been increasing confused in recent history with behavioral change.        GoC: Full Code (05 Jul 2020 22:04)            Pt was admitted to the hospital for likely alcohol withdrawal seizures and or benzo withdrawal with elevated troponin. Pt was seen by neurologist who recommended     liver work up concern for alcohol induced liver changes and cardiologist recommended  echo cardiogram for elevated troponin. But pt refused further work up and wanted to be discharged as she is asymptomatic. Attempt wasmade to convince pt to complete the work up and all risk and benefits were discussed. Pt understands the risk of being d/c AMA but still wants to go home. Pt will be discharged AMA.

## 2020-07-06 NOTE — PROGRESS NOTE ADULT - PROBLEM SELECTOR PLAN 7
Migraines has been controlled recent years  Pt takes Norco at home, non-formulary inpatient  Start percocet

## 2020-07-06 NOTE — CONSULT NOTE ADULT - ASSESSMENT
61 yo F with HTN, EtOH abuse, anxiety on Xanax (recently ran out) who presented after episodes of seizures and confusion noted at home.     1. Mildly elevated troponin level: May be noncardiac in  setting of recurrent seizures from benzo/EtOH withdrawal  -Reasonable to check echocardiogram to assess EF in setting of chronic EtOH consumption    2. HTN: On HCTZ 12.5mg at home, currently well controlled 59 yo F with HTN, EtOH abuse, anxiety on Xanax (recently ran out) who presented after episodes of seizures and confusion noted at home.     1. Mildly elevated troponin level: May be noncardiac in  setting of recurrent seizures from benzo/EtOH withdrawal  -Reasonable to check echocardiogram to assess EF in setting of chronic EtOH consumption  -Trend troponins to peak with serial EKGs    2. HTN: On HCTZ 12.5mg at home, currently well controlled

## 2020-07-06 NOTE — PROGRESS NOTE ADULT - SUBJECTIVE AND OBJECTIVE BOX
HPI:  60 year old female, lives at home with , with PMHx of Migraines, HTN, EtOH abuse, anxiety, on Xanax, Hepatitis C(Treated with interferon for 2 years late ), after 2 episodes of convulsions. First episode occurred early afternoon prior to admission, patient was outside in the sun when  noticed patient to have a convulsion with associated confusion, weakness.  Patient examined at bedside, resting comfortably, denies NVD or SOB.  Endorses headache.  NAD    OVERNIGHT EVENTS:  No new overnight events     REVIEW OF SYSTEMS:      CONSTITUTIONAL: No fever,   EYES: no acute visual disturbances  NECK: No pain or stiffness  RESPIRATORY: No cough; No shortness of breath  CARDIOVASCULAR: No chest pain, no palpitations  GASTROINTESTINAL: No pain. No nausea, vomiting or diarrhea   NEUROLOGICAL: + headache or numbness, no tremors  MUSCULOSKELETAL: No joint pain, no muscle pain  GENITOURINARY: no dysuria, no frequency, no hesitancy  PSYCHIATRY: no depression , no anxiety  ALL OTHER  ROS negative        Vital Signs Last 24 Hrs  T(C): 37.1 (2020 11:02), Max: 37.6 (2020 19:32)  T(F): 98.7 (2020 11:02), Max: 99.7 (2020 19:32)  HR: 82 (2020 11:02) (75 - 105)  BP: 115/67 (2020 11:02) (114/63 - 163/87)  BP(mean): --  RR: 18 (2020 11:02) (18 - 20)  SpO2: 99% (2020 11:02) (98% - 100%)    ________________________________________________  PHYSICAL EXAM:    GENERAL: NAD  HEENT: Normocephalic; conjunctivae and sclerae clear; moist mucous membranes;   NECK : supple, no JVD  CHEST/LUNG: Clear to auscultation bilaterally   HEART: S1 S2  regular  ABDOMEN: Soft, Nontender, Nondistended; Bowel sounds present  EXTREMITIES: no cyanosis; no LE edema; no calf tenderness  SKIN: warm and dry; no rash  NERVOUS SYSTEM:  Alert & Oriented x3; no new deficits    _________________________________________________  CURRENT MEDICATIONS:    MEDICATIONS  (STANDING):  artificial  tears Solution 1 Drop(s) Both EYES four times a day  enoxaparin Injectable 40 milliGRAM(s) SubCutaneous daily  folic acid 1 milliGRAM(s) Oral daily  hydrochlorothiazide 12.5 milliGRAM(s) Oral daily  ketotifen 0.025% Ophthalmic Solution 1 Drop(s) Both EYES every 8 hours  montelukast 10 milliGRAM(s) Oral at bedtime  multivitamin 1 Tablet(s) Oral daily  nicotine -  14 mG/24Hr(s) Patch 1 patch Transdermal daily  thiamine IVPB 250 milliGRAM(s) IV Intermittent three times a day    MEDICATIONS  (PRN):  acetaminophen   Tablet .. 650 milliGRAM(s) Oral every 4 hours PRN Mild Pain (1 - 3)  ALPRAZolam 1 milliGRAM(s) Oral daily PRN anxiety  ibuprofen  Tablet. 600 milliGRAM(s) Oral every 6 hours PRN Moderate Pain (4 - 6)  LORazepam   Injectable 2 milliGRAM(s) IV Push every 2 hours PRN CIWA>7, Seizure activity  oxycodone    5 mG/acetaminophen 325 mG 1 Tablet(s) Oral every 6 hours PRN Severe Pain (7 - 10)      __________________________________________________  LABS:                          10.7   2.59  )-----------( 69       ( 2020 06:40 )             31.6     07-06    140  |  105  |  4<L>  ----------------------------<  94  3.6   |  29  |  0.54    Ca    7.9<L>      2020 06:40  Phos  2.8     07-06  Mg     1.8     07-06    TPro  5.8<L>  /  Alb  2.3<L>  /  TBili  2.1<H>  /  DBili  x   /  AST  57<H>  /  ALT  22  /  AlkPhos  130<H>      PT/INR - ( 2020 06:40 )   PT: 14.6 sec;   INR: 1.26 ratio         PTT - ( 2020 06:40 )  PTT:39.1 sec  Urinalysis Basic - ( 2020 22:13 )    Color: Yellow / Appearance: Clear / S.005 / pH: x  Gluc: x / Ketone: Negative  / Bili: Negative / Urobili: Negative   Blood: x / Protein: Negative / Nitrite: Negative   Leuk Esterase: Negative / RBC: x / WBC x   Sq Epi: x / Non Sq Epi: x / Bacteria: x      CAPILLARY BLOOD GLUCOSE      POCT Blood Glucose.: 126 mg/dL (2020 19:23)      __________________________________________________  RADIOLOGY & ADDITIONAL TESTS:    Imaging Personally Reviewed:  YES    < from: CT Head No Cont (20 @ 19:46) >  IMPRESSION:  No acute intracranial hemorrhage or mass effect.    < end of copied text >    Consultant(s) Notes Reviewed:   YES     Plan of care was discussed with patient and /or primary care giver; all questions and concerns were addressed and care was aligned with patient's wishes.    Plan discussed with attending and consulting physicians.

## 2020-07-06 NOTE — CONSULT NOTE ADULT - SUBJECTIVE AND OBJECTIVE BOX
CHIEF COMPLAINT: Seizures    HPI: 59 yo F with HTN, EtOh abuse, anxiety on Xanax (recently ran out) who presented after episodes of seizures and confusion noted at home.     PAST MEDICAL & SURGICAL HISTORY:  As above, also RA (rheumatoid arthritis), Hepatitis C  No significant past surgical history    Allergies    penicillin (Rash)    MEDICATIONS  (STANDING):  artificial  tears Solution 1 Drop(s) Both EYES four times a day  enoxaparin Injectable 40 milliGRAM(s) SubCutaneous daily  folic acid 1 milliGRAM(s) Oral daily  hydrochlorothiazide 12.5 milliGRAM(s) Oral daily  ketotifen 0.025% Ophthalmic Solution 1 Drop(s) Both EYES every 8 hours  montelukast 10 milliGRAM(s) Oral at bedtime  multivitamin 1 Tablet(s) Oral daily  nicotine -  14 mG/24Hr(s) Patch 1 patch Transdermal daily  thiamine IVPB 250 milliGRAM(s) IV Intermittent three times a day    MEDICATIONS  (PRN):  acetaminophen   Tablet .. 650 milliGRAM(s) Oral every 4 hours PRN Mild Pain (1 - 3)  ALPRAZolam 1 milliGRAM(s) Oral daily PRN anxiety  ibuprofen  Tablet. 600 milliGRAM(s) Oral every 6 hours PRN Moderate Pain (4 - 6)  LORazepam   Injectable 2 milliGRAM(s) IV Push every 2 hours PRN CIWA>7, Seizure activity  oxycodone    5 mG/acetaminophen 325 mG 1 Tablet(s) Oral every 6 hours PRN Severe Pain (7 - 10)      FAMILY HISTORY:    No family history of premature coronary artery disease or sudden cardiac death    SOCIAL HISTORY:  Smoking-  Alcohol-  Illicit Drug use-    REVIEW OF SYSTEMS:  Constitutional: [ ] fever, [ ]weight loss,  [ ]fatigue  Eyes: [ ] visual changes  Respiratory: [ ]shortness of breath;  [ ] cough, [ ]wheezing, [ ]chills, [ ]hemoptysis  Cardiovascular: [ ] chest pain, [ ]palpitations, [ ]dizziness,  [ ]leg swelling [ ]syncope  Gastrointestinal: [ ] abdominal pain, [ ]nausea, [ ]vomiting,  [ ]diarrhea   Genitourinary: [ ] dysuria, [ ] hematuria  Neurologic: [ ] headaches [ ] tremors  [ ] weakness [ ] lightheadedness  Skin: [ ] itching, [ ]burning, [ ] rashes  Endocrine: [ ] heat or cold intolerance  Musculoskeletal: [ ] joint pain or swelling; [ ] muscle, back, or extremity pain  Psychiatric: [ ] depression, [ ]anxiety, [ ]mood swings, or [ ]difficulty sleeping  Hematologic: [ ] easy bruising, [ ] bleeding gums       [ x] All others negative	  [ ] Unable to obtain    Vital Signs Last 24 Hrs  T(C): 37.2 (06 Jul 2020 07:28), Max: 37.6 (05 Jul 2020 19:32)  T(F): 98.9 (06 Jul 2020 07:28), Max: 99.7 (05 Jul 2020 19:32)  HR: 75 (06 Jul 2020 07:28) (75 - 105)  BP: 114/63 (06 Jul 2020 07:28) (114/63 - 163/87)  BP(mean): --  RR: 18 (06 Jul 2020 07:28) (18 - 20)  SpO2: 100% (06 Jul 2020 07:28) (98% - 100%)  I&O's Summary      PHYSICAL EXAM:  General: No acute distress  HEENT: EOMI, PERRL  Neck: Supple, No JVD  Lungs: Clear to auscultation bilaterally; No rales or wheezing  Heart: Regular rate and rhythm; No murmurs, rubs, or gallops  Abdomen: Nontender, bowel sounds present  Extremities: No clubbing, cyanosis, or edema  Nervous system:  Alert & Oriented X3, no focal deficits  Psychiatric: Normal affect  Skin: No rashes or lesions      LABS:  07-06    140  |  105  |  4<L>  ----------------------------<  94  3.6   |  29  |  0.54    Ca    7.9<L>      06 Jul 2020 06:40  Phos  2.8     07-06  Mg     1.8     07-06    TPro  5.8<L>  /  Alb  2.3<L>  /  TBili  2.1<H>  /  DBili  x   /  AST  57<H>  /  ALT  22  /  AlkPhos  130<H>  07-06    Creatinine Trend: 0.54<--, 0.78<--                        10.7   2.59  )-----------( 69       ( 06 Jul 2020 06:40 )             31.6     PT/INR - ( 06 Jul 2020 06:40 )   PT: 14.6 sec;   INR: 1.26 ratio         PTT - ( 06 Jul 2020 06:40 )  PTT:39.1 sec    Lipid Panel: Cholesterol, Serum 93  Direct LDL 32  HDL Cholesterol, Serum 51  Triglycerides, Serum 52    Cardiac Enzymes: CARDIAC MARKERS ( 06 Jul 2020 06:40 )  0.228 ng/mL / x     / x     / x     / x        RADIOLOGY: < from: Xray Chest 1 View AP/PA (07.05.20 @ 20:34) >  IMPRESSION: No acute finding.    < end of copied text >    < from: CT Head No Cont (07.05.20 @ 19:46) >    IMPRESSION:  No acute intracranial hemorrhage or mass effect.    < end of copied text >    ECG [my interpretation]: 7/5/2020 @ 19:27: Sinus tachycardia at 102 bpm, normal axis and intervals, normal EKG    TELEMETRY:    ECHO: CHIEF COMPLAINT: Seizures    HPI: 61 yo F with HTN, EtOH abuse, anxiety on Xanax (recently ran out) who presented after episodes of seizures and confusion noted at home. Patient reports she stepped outside to smoke the day for admission, then felt "something coming on." She felt her lip drooping, walked back into the house, bit her lip, then had convulsions as witnessed by . She had another episode upon arrival of EMS. Denies chest pain, dyspnea, palpitations, or lightheadedness.     PAST MEDICAL & SURGICAL HISTORY:  As above, also RA (rheumatoid arthritis), Hepatitis C  No significant past surgical history    Allergies    penicillin (Rash)    MEDICATIONS  (STANDING):  artificial  tears Solution 1 Drop(s) Both EYES four times a day  enoxaparin Injectable 40 milliGRAM(s) SubCutaneous daily  folic acid 1 milliGRAM(s) Oral daily  hydrochlorothiazide 12.5 milliGRAM(s) Oral daily  ketotifen 0.025% Ophthalmic Solution 1 Drop(s) Both EYES every 8 hours  montelukast 10 milliGRAM(s) Oral at bedtime  multivitamin 1 Tablet(s) Oral daily  nicotine -  14 mG/24Hr(s) Patch 1 patch Transdermal daily  thiamine IVPB 250 milliGRAM(s) IV Intermittent three times a day    MEDICATIONS  (PRN):  acetaminophen   Tablet .. 650 milliGRAM(s) Oral every 4 hours PRN Mild Pain (1 - 3)  ALPRAZolam 1 milliGRAM(s) Oral daily PRN anxiety  ibuprofen  Tablet. 600 milliGRAM(s) Oral every 6 hours PRN Moderate Pain (4 - 6)  LORazepam   Injectable 2 milliGRAM(s) IV Push every 2 hours PRN CIWA>7, Seizure activity  oxycodone    5 mG/acetaminophen 325 mG 1 Tablet(s) Oral every 6 hours PRN Severe Pain (7 - 10)      FAMILY HISTORY:    No family history of premature coronary artery disease or sudden cardiac death    SOCIAL HISTORY:  Smoking-Active  Alcohol-Active  Illicit Drug use-denies    REVIEW OF SYSTEMS:  Constitutional: [ ] fever, [ ]weight loss,  [ ]fatigue  Eyes: [ ] visual changes  Respiratory: [ ]shortness of breath;  [ ] cough, [ ]wheezing, [ ]chills, [ ]hemoptysis  Cardiovascular: [ ] chest pain, [ ]palpitations, [ ]dizziness,  [ ]leg swelling [ ]syncope  Gastrointestinal: [ ] abdominal pain, [ ]nausea, [ ]vomiting,  [ ]diarrhea   Genitourinary: [ ] dysuria, [ ] hematuria  Neurologic: [ ] headaches [ ] tremors  [ ] weakness [ ] lightheadedness  Skin: [ ] itching, [ ]burning, [ ] rashes  Endocrine: [ ] heat or cold intolerance  Musculoskeletal: [ ] joint pain or swelling; [ ] muscle, back, or extremity pain  Psychiatric: [ ] depression, [ ]anxiety, [ ]mood swings, or [ ]difficulty sleeping  Hematologic: [ ] easy bruising, [ ] bleeding gums       [ x] All others negative	  [ ] Unable to obtain    Vital Signs Last 24 Hrs  T(C): 37.2 (06 Jul 2020 07:28), Max: 37.6 (05 Jul 2020 19:32)  T(F): 98.9 (06 Jul 2020 07:28), Max: 99.7 (05 Jul 2020 19:32)  HR: 75 (06 Jul 2020 07:28) (75 - 105)  BP: 114/63 (06 Jul 2020 07:28) (114/63 - 163/87)  BP(mean): --  RR: 18 (06 Jul 2020 07:28) (18 - 20)  SpO2: 100% (06 Jul 2020 07:28) (98% - 100%)  I&O's Summary      PHYSICAL EXAM:  General: No acute distress  HEENT: EOMI, PERRL  Neck: Supple, No JVD  Lungs: Clear to auscultation bilaterally; No rales or wheezing  Heart: Regular rate and rhythm; No murmurs, rubs, or gallops  Abdomen: Nontender, bowel sounds present  Extremities: No clubbing, cyanosis, or edema  Nervous system:  Alert & Oriented X3, no focal deficits  Psychiatric: Normal affect  Skin: No rashes or lesions      LABS:  07-06    140  |  105  |  4<L>  ----------------------------<  94  3.6   |  29  |  0.54    Ca    7.9<L>      06 Jul 2020 06:40  Phos  2.8     07-06  Mg     1.8     07-06    TPro  5.8<L>  /  Alb  2.3<L>  /  TBili  2.1<H>  /  DBili  x   /  AST  57<H>  /  ALT  22  /  AlkPhos  130<H>  07-06    Creatinine Trend: 0.54<--, 0.78<--                        10.7   2.59  )-----------( 69       ( 06 Jul 2020 06:40 )             31.6     PT/INR - ( 06 Jul 2020 06:40 )   PT: 14.6 sec;   INR: 1.26 ratio         PTT - ( 06 Jul 2020 06:40 )  PTT:39.1 sec    Lipid Panel: Cholesterol, Serum 93  Direct LDL 32  HDL Cholesterol, Serum 51  Triglycerides, Serum 52    Cardiac Enzymes: CARDIAC MARKERS ( 06 Jul 2020 06:40 )  0.228 ng/mL / x     / x     / x     / x        RADIOLOGY: < from: Xray Chest 1 View AP/PA (07.05.20 @ 20:34) >  IMPRESSION: No acute finding.    < end of copied text >    < from: CT Head No Cont (07.05.20 @ 19:46) >    IMPRESSION:  No acute intracranial hemorrhage or mass effect.    < end of copied text >    ECG [my interpretation]: 7/5/2020 @ 19:27: Sinus tachycardia at 102 bpm, normal axis and intervals, normal EKG    TELEMETRY:    ECHO:

## 2020-07-06 NOTE — CONSULT NOTE ADULT - ASSESSMENT
61yo female w/ transient lip droop and tongue biting    Recommendations:    - 61yo female w/ sxms c/w ETOH intoxication and missed drink    Recommendations:    -No neurological deficits concerning for either seizure or stroke.  No neurological barriers to dc.    -Continued mgmt per primary team 61yo female w/ sxms c/w ETOH intoxication and missed drink    Recommendations:    -No neurological deficits concerning for either seizure or stroke.  No neurological barriers to d/c.    -Continued mgmt per primary team 59yo female w/ sxms c/w ETOH intoxication and missed drink    Recommendations:    -No neurological deficits concerning for either seizure or stroke.  No neurological barriers to d/c.    -Continued mgmt of EtOH/benzo withdrawal per primary team

## 2020-07-06 NOTE — DISCHARGE NOTE NURSING/CASE MANAGEMENT/SOCIAL WORK - PATIENT PORTAL LINK FT
You can access the FollowMyHealth Patient Portal offered by [facility] by registering at the following website: [email or web address]. By joining [employer]’s FollowMyHealth portal, you will also be able to view your health information using other applications (apps) compatible with our system.

## 2020-07-06 NOTE — CONSULT NOTE ADULT - SUBJECTIVE AND OBJECTIVE BOX
Patient is a 60y old  Female who presents with a chief complaint of Convulsions (2020 12:36)      HPI:  Patient is a 60 year old female, lives at home with , with PMHx of Migraines, HTN, EtOH abuse, anxiety, on Xanax, Hepatitis C(Treated with interferon for 2 years late ), after 2 episodes of convulsions. First episode occurred early afternoon prior to admission, patient was outside in the sun when  noticed patient to have a convulsion with associated confusion, weakness. Patient was then brought inside where symptoms lingered. At approximately 6pm prior to arrival in hospital patient had another episode in which patient had another episode of convulsion, this time with blood coming out of mouth.     To note, patient drinks approximately 6 beers a day for many years, On Xanax that ran out recently. Patient's last drink approximately 24 hours prior to arrival at hospital. As per , patient has been increasing confused in recent history with behavioral change.    GoC: Full Code (2020 22:04)    Pt reports yesterday @ 1:30PM she walked outside to smoke a cigarette.  States, I could feel it coming on b/c I could feel my head shaking."  Denies LOC, N&V, but reports lip droop and biting tongue.  States she was able to walk back into her house w/o difficulty and change her clothes.  States she changed and put on shorts b/c "it was hot", denies urinating or defecating on herself.      At baseline walks w/ cane or walker since .      (+) ETOH abuse, drinks beer minimum 2 cans daily, last drink .       Neurological Review of Systems:  No difficulty with language.  No vision loss or double vision.  No dizziness, vertigo or new hearing loss.  No difficulty with speech or swallowing.  No focal weakness.  No focal sensory changes.  No numbness or tingling in the bilateral lower extremities.  (+) Difficulty with balance.  (+) Difficulty with ambulation.        MEDICATIONS  (STANDING):  artificial  tears Solution 1 Drop(s) Both EYES four times a day  enoxaparin Injectable 40 milliGRAM(s) SubCutaneous daily  folic acid 1 milliGRAM(s) Oral daily  hydrochlorothiazide 12.5 milliGRAM(s) Oral daily  ketotifen 0.025% Ophthalmic Solution 1 Drop(s) Both EYES every 8 hours  montelukast 10 milliGRAM(s) Oral at bedtime  multivitamin 1 Tablet(s) Oral daily  nicotine -  14 mG/24Hr(s) Patch 1 patch Transdermal daily  thiamine IVPB 250 milliGRAM(s) IV Intermittent three times a day    MEDICATIONS  (PRN):  acetaminophen   Tablet .. 650 milliGRAM(s) Oral every 4 hours PRN Mild Pain (1 - 3)  ALPRAZolam 1 milliGRAM(s) Oral daily PRN anxiety  ibuprofen  Tablet. 600 milliGRAM(s) Oral every 6 hours PRN Moderate Pain (4 - 6)  LORazepam   Injectable 2 milliGRAM(s) IV Push every 2 hours PRN CIWA>7, Seizure activity  oxycodone    5 mG/acetaminophen 325 mG 1 Tablet(s) Oral every 6 hours PRN Severe Pain (7 - 10)    Allergies    penicillin (Rash)    Intolerances      PAST MEDICAL & SURGICAL HISTORY:  ETOH abuse  Right knee pain  RA (rheumatoid arthritis)  Hepatitis C  No significant past surgical history    FAMILY HISTORY:    SOCIAL HISTORY: (+) Smoker x 51yrs, 0.5 packs/day.    Review of Systems:  Constitutional: No generalized weakness. No fevers or chills.                    Eyes, Ears, Mouth, Throat: No vision loss   Respiratory: No shortness of breath or cough.                                Cardiovascular: No chest pain or palpitations  Gastrointestinal: No nausea or vomiting.                                         Genitourinary: No urinary incontinence or burning on urination.  Musculoskeletal: No joint pain.                                                           Dermatologic: No rash.  Neurological: as per HPI                                                                      Psychiatric: No behavioral problems.  Endocrine: No known hypoglycemia.               Hematologic/Lymphatic: No easy bleeding.    O:  Vital Signs Last 24 Hrs  T(C): 37.1 (2020 11:02), Max: 37.6 (2020 19:32)  T(F): 98.7 (2020 11:02), Max: 99.7 (2020 19:32)  HR: 82 (2020 11:02) (75 - 105)  BP: 115/67 (2020 11:02) (114/63 - 163/87)  RR: 18 (2020 11:02) (18 - 20)  SpO2: 99% (2020 11:02) (98% - 100%)    General Exam:   General appearance: No acute distress                 Cardiovascular: Pedal dorsalis pulses intact bilaterally    Mental Status: Orientated to self, date and place.  Attention impaired.  No dysarthria, aphasia or neglect.  Knowledge impaired.      Cranial Nerves: CN I - not tested.  PERRL, EOMI, VFF, no nystagmus or diplopia.  No APD.  Fundi not visualized.  CN V1-3 intact to light touch.  No facial asymmetry.  Hearing intact to finger rub bilaterally.  Tongue, uvula and palate midline.  Sternocleidomastoid and Trapezius intact bilaterally.    Motor:   Tone: Normal                 Strength impaired equally throughout, 4/5  No pronator drift bilaterally                      No dysmetria on finger-nose-finger or heel-shin-heel  No truncal ataxia.  No resting, postural or action tremor.  No myoclonus.    Sensation: intact to light touch    Deep Tendon Reflexes: 1+ bilateral biceps, triceps, brachioradialis, knee and ankle  Toes mute bilaterally    Gait: Deferred d/t instability    Other:   NIHSS=0  MRS=0    LABS:                        10.7   2.59  )-----------( 69       ( 2020 06:40 )             31.6     07-06    140  |  105  |  4<L>  ----------------------------<  94  3.6   |  29  |  0.54    Ca    7.9<L>      2020 06:40  Phos  2.8     07-06  Mg     1.8     07-06    TPro  5.8<L>  /  Alb  2.3<L>  /  TBili  2.1<H>  /  DBili  x   /  AST  57<H>  /  ALT  22  /  AlkPhos  130<H>  07-06    PT/INR - ( 2020 06:40 )   PT: 14.6 sec;   INR: 1.26 ratio         PTT - ( 2020 06:40 )  PTT:39.1 sec  Urinalysis Basic - ( 2020 22:13 )    Color: Yellow / Appearance: Clear / S.005 / pH: x  Gluc: x / Ketone: Negative  / Bili: Negative / Urobili: Negative   Blood: x / Protein: Negative / Nitrite: Negative   Leuk Esterase: Negative / RBC: x / WBC x   Sq Epi: x / Non Sq Epi: x / Bacteria: x          RADIOLOGY & ADDITIONAL STUDIES:  < from: CT Head No Cont (20 @ 19:46) >    EXAM:  CT BRAIN                            PROCEDURE DATE:  2020          INTERPRETATION:  CT HEAD WITHOUT CONTRAST    INDICATION: 60 years old. Female. seizure.    COMPARISON: None available.    TECHNIQUE: Noncontrast axial CT head was obtained from the skull base to vertex.    FINDINGS:  No acute intracranial hemorrhage, mass effect or midline shift.  No CT evidence of acute large territory vascular infarct.  The ventricles and cortical sulci are within normal limits for age.    The paranasal sinuses and mastoid air cells are well aerated.  No displaced calvarial fracture.    IMPRESSION:  No acute intracranial hemorrhage or mass effect.                HUNG TRAVIS M.D., ATTENDING RADIOLOGIST  This document has been electronically signed. 2020  7:53PM    < end of copied text > Patient is a 60y old  Female who presents with a chief complaint of Convulsions (2020 12:36)      HPI:  Patient is a 60 year old female, lives at home with , with PMHx of Migraines, HTN, EtOH abuse, anxiety, on Xanax, Hepatitis C(Treated with interferon for 2 years late ), after 2 episodes of convulsions. First episode occurred early afternoon prior to admission, patient was outside in the sun when  noticed patient to have a convulsion with associated confusion, weakness. Patient was then brought inside where symptoms lingered. At approximately 6pm prior to arrival in hospital patient had another episode in which patient had another episode of convulsion, this time with blood coming out of mouth.     To note, patient drinks approximately 6 beers a day for many years, On Xanax that ran out recently. Patient's last drink approximately 24 hours prior to arrival at hospital. As per , patient has been increasing confused in recent history with behavioral change.    GoC: Full Code (2020 22:04)    Pt reports yesterday @ 1:30PM she walked outside to smoke a cigarette.  States, "I could feel it coming on b/c I could feel my head shaking."  Denies LOC, N&V, but reports lip droop and biting tongue.  States she was able to walk back into her house w/o difficulty and change her clothes.  States she changed and put on shorts b/c "it was hot", denies urinating or defecating on herself.      At baseline walks w/ cane or walker since  {reports family Hx of early-onset OA; says she has had bone-on-bone contact R knee for many years and was told needs replacement}.      (+) ETOH abuse, drinks beer minimum 2 cans daily, last drink /.   {Drinks extra-tall double size cans, likely at least pint size}    Neurological Review of Systems:  No difficulty with language.  No vision loss or double vision.  No dizziness, vertigo or new hearing loss.  No difficulty with speech or swallowing.  No focal weakness.  No focal sensory changes.  No numbness or tingling in the bilateral lower extremities.  (+) Difficulty with balance.  (+) Difficulty with ambulation.        MEDICATIONS  (STANDING):  artificial  tears Solution 1 Drop(s) Both EYES four times a day  enoxaparin Injectable 40 milliGRAM(s) SubCutaneous daily  folic acid 1 milliGRAM(s) Oral daily  hydrochlorothiazide 12.5 milliGRAM(s) Oral daily  ketotifen 0.025% Ophthalmic Solution 1 Drop(s) Both EYES every 8 hours  montelukast 10 milliGRAM(s) Oral at bedtime  multivitamin 1 Tablet(s) Oral daily  nicotine -  14 mG/24Hr(s) Patch 1 patch Transdermal daily  thiamine IVPB 250 milliGRAM(s) IV Intermittent three times a day    MEDICATIONS  (PRN):  acetaminophen   Tablet .. 650 milliGRAM(s) Oral every 4 hours PRN Mild Pain (1 - 3)  ALPRAZolam 1 milliGRAM(s) Oral daily PRN anxiety  ibuprofen  Tablet. 600 milliGRAM(s) Oral every 6 hours PRN Moderate Pain (4 - 6)  LORazepam   Injectable 2 milliGRAM(s) IV Push every 2 hours PRN CIWA>7, Seizure activity  oxycodone    5 mG/acetaminophen 325 mG 1 Tablet(s) Oral every 6 hours PRN Severe Pain (7 - 10)    Allergies    penicillin (Rash)    Intolerances      PAST MEDICAL & SURGICAL HISTORY:  ETOH abuse  Right knee pain  RA (rheumatoid arthritis)  Hepatitis C  No significant past surgical history    FAMILY HISTORY:    SOCIAL HISTORY: (+) Smoker x 51yrs, 0.5 packs/day.    Review of Systems:  Constitutional: No generalized weakness. No fevers or chills.                    Eyes, Ears, Mouth, Throat: No vision loss   Respiratory: No shortness of breath or cough.                                Cardiovascular: No chest pain or palpitations  Gastrointestinal: No nausea or vomiting.                                         Genitourinary: No urinary incontinence or burning on urination.  Musculoskeletal: No joint pain.                                                           Dermatologic: No rash.  Neurological: as per HPI                                                                      Psychiatric: No behavioral problems.  Endocrine: No known hypoglycemia.               Hematologic/Lymphatic: No easy bleeding.    O:  Vital Signs Last 24 Hrs  T(C): 37.1 (2020 11:02), Max: 37.6 (2020 19:32)  T(F): 98.7 (2020 11:02), Max: 99.7 (2020 19:32)  HR: 82 (2020 11:02) (75 - 105)  BP: 115/67 (2020 11:02) (114/63 - 163/87)  RR: 18 (2020 11:02) (18 - 20)  SpO2: 99% (2020 11:02) (98% - 100%)    General Exam:   General appearance: No acute distress                 Cardiovascular: Pedal dorsalis pulses intact bilaterally    Mental Status: Orientated to self, date and place.  Attention impaired.  No dysarthria, aphasia or neglect.  Knowledge impaired.      Cranial Nerves: CN I - not tested.  PERRL, EOMI, VFF, no nystagmus or diplopia.  No APD.  Fundi not visualized.  CN V1-3 intact to light touch.  No facial asymmetry.  Hearing intact to finger rub bilaterally.  Tongue, uvula and palate midline.  Sternocleidomastoid and Trapezius intact bilaterally.    Motor:   Tone: Normal                 Strength impaired equally throughout, 4/5  No pronator drift bilaterally                      No dysmetria on finger-nose-finger or heel-shin-heel  No truncal ataxia.  No resting, postural or action tremor.  No myoclonus.    Sensation: intact to light touch    Deep Tendon Reflexes: 1+ bilateral biceps, triceps, brachioradialis, knee and ankle  Toes mute bilaterally    Gait: Deferred d/t instability    Other:   NIHSS=0  MRS=0    LABS:                        10.7   2.59  )-----------( 69       ( 2020 06:40 )             31.6     07-06    140  |  105  |  4<L>  ----------------------------<  94  3.6   |  29  |  0.54    Ca    7.9<L>      2020 06:40  Phos  2.8     07-06  Mg     1.8     07-06    TPro  5.8<L>  /  Alb  2.3<L>  /  TBili  2.1<H>  /  DBili  x   /  AST  57<H>  /  ALT  22  /  AlkPhos  130<H>  07-06    PT/INR - ( 2020 06:40 )   PT: 14.6 sec;   INR: 1.26 ratio         PTT - ( 2020 06:40 )  PTT:39.1 sec  Urinalysis Basic - ( 2020 22:13 )    Color: Yellow / Appearance: Clear / S.005 / pH: x  Gluc: x / Ketone: Negative  / Bili: Negative / Urobili: Negative   Blood: x / Protein: Negative / Nitrite: Negative   Leuk Esterase: Negative / RBC: x / WBC x   Sq Epi: x / Non Sq Epi: x / Bacteria: x          RADIOLOGY & ADDITIONAL STUDIES:  < from: CT Head No Cont (20 @ 19:46) >    EXAM:  CT BRAIN                            PROCEDURE DATE:  2020          INTERPRETATION:  CT HEAD WITHOUT CONTRAST    INDICATION: 60 years old. Female. seizure.    COMPARISON: None available.    TECHNIQUE: Noncontrast axial CT head was obtained from the skull base to vertex.    FINDINGS:  No acute intracranial hemorrhage, mass effect or midline shift.  No CT evidence of acute large territory vascular infarct.  The ventricles and cortical sulci are within normal limits for age.    The paranasal sinuses and mastoid air cells are well aerated.  No displaced calvarial fracture.    IMPRESSION:  No acute intracranial hemorrhage or mass effect.                HUNG TRAVIS M.D., ATTENDING RADIOLOGIST  This document has been electronically signed. 2020  7:53PM    < end of copied text >

## 2020-07-06 NOTE — PROGRESS NOTE ADULT - PROBLEM SELECTOR PLAN 4
Hx of Hepatitis C treated from [id number] with interferon.  Follow up Hep C  INR 1.26  Follow up US RUQ

## 2021-01-26 ENCOUNTER — INPATIENT (INPATIENT)
Facility: HOSPITAL | Age: 61
LOS: 3 days | Discharge: HOME | DRG: 896 | End: 2021-01-30
Attending: INTERNAL MEDICINE | Admitting: INTERNAL MEDICINE
Payer: MEDICAID

## 2021-01-26 VITALS
TEMPERATURE: 98 F | DIASTOLIC BLOOD PRESSURE: 68 MMHG | SYSTOLIC BLOOD PRESSURE: 138 MMHG | RESPIRATION RATE: 18 BRPM | HEIGHT: 65 IN | OXYGEN SATURATION: 98 % | HEART RATE: 96 BPM | WEIGHT: 121.92 LBS

## 2021-01-26 DIAGNOSIS — G43.909 MIGRAINE, UNSPECIFIED, NOT INTRACTABLE, WITHOUT STATUS MIGRAINOSUS: ICD-10-CM

## 2021-01-26 DIAGNOSIS — Z71.89 OTHER SPECIFIED COUNSELING: ICD-10-CM

## 2021-01-26 DIAGNOSIS — G93.40 ENCEPHALOPATHY, UNSPECIFIED: ICD-10-CM

## 2021-01-26 DIAGNOSIS — F10.239 ALCOHOL DEPENDENCE WITH WITHDRAWAL, UNSPECIFIED: ICD-10-CM

## 2021-01-26 DIAGNOSIS — R56.9 UNSPECIFIED CONVULSIONS: ICD-10-CM

## 2021-01-26 DIAGNOSIS — F13.20 SEDATIVE, HYPNOTIC OR ANXIOLYTIC DEPENDENCE, UNCOMPLICATED: ICD-10-CM

## 2021-01-26 DIAGNOSIS — Z98.891 HISTORY OF UTERINE SCAR FROM PREVIOUS SURGERY: Chronic | ICD-10-CM

## 2021-01-26 DIAGNOSIS — M25.561 PAIN IN RIGHT KNEE: ICD-10-CM

## 2021-01-26 DIAGNOSIS — Z29.9 ENCOUNTER FOR PROPHYLACTIC MEASURES, UNSPECIFIED: ICD-10-CM

## 2021-01-26 PROBLEM — F10.10 ALCOHOL ABUSE, UNCOMPLICATED: Chronic | Status: ACTIVE | Noted: 2020-07-05

## 2021-01-26 LAB
ACETONE SERPL-MCNC: NEGATIVE — SIGNIFICANT CHANGE UP
ALBUMIN SERPL ELPH-MCNC: 2.8 G/DL — LOW (ref 3.5–5)
ALBUMIN SERPL ELPH-MCNC: 3.2 G/DL — LOW (ref 3.5–5)
ALP SERPL-CCNC: 112 U/L — SIGNIFICANT CHANGE UP (ref 40–120)
ALP SERPL-CCNC: 124 U/L — HIGH (ref 40–120)
ALT FLD-CCNC: 20 U/L DA — SIGNIFICANT CHANGE UP (ref 10–60)
ALT FLD-CCNC: 21 U/L DA — SIGNIFICANT CHANGE UP (ref 10–60)
AMMONIA BLD-MCNC: 43 UMOL/L — HIGH (ref 11–32)
AMPHET UR-MCNC: NEGATIVE — SIGNIFICANT CHANGE UP
ANION GAP SERPL CALC-SCNC: 11 MMOL/L — SIGNIFICANT CHANGE UP (ref 5–17)
ANION GAP SERPL CALC-SCNC: 6 MMOL/L — SIGNIFICANT CHANGE UP (ref 5–17)
APPEARANCE UR: CLEAR — SIGNIFICANT CHANGE UP
APTT BLD: 34.1 SEC — SIGNIFICANT CHANGE UP (ref 27.5–35.5)
AST SERPL-CCNC: 40 U/L — SIGNIFICANT CHANGE UP (ref 10–40)
AST SERPL-CCNC: 47 U/L — HIGH (ref 10–40)
BARBITURATES UR SCN-MCNC: NEGATIVE — SIGNIFICANT CHANGE UP
BASOPHILS # BLD AUTO: 0.02 K/UL — SIGNIFICANT CHANGE UP (ref 0–0.2)
BASOPHILS NFR BLD AUTO: 0.7 % — SIGNIFICANT CHANGE UP (ref 0–2)
BENZODIAZ UR-MCNC: NEGATIVE — SIGNIFICANT CHANGE UP
BILIRUB DIRECT SERPL-MCNC: 0.5 MG/DL — HIGH (ref 0–0.2)
BILIRUB INDIRECT FLD-MCNC: 0.4 MG/DL — SIGNIFICANT CHANGE UP (ref 0.2–1)
BILIRUB SERPL-MCNC: 0.9 MG/DL — SIGNIFICANT CHANGE UP (ref 0.2–1.2)
BILIRUB SERPL-MCNC: 1 MG/DL — SIGNIFICANT CHANGE UP (ref 0.2–1.2)
BILIRUB UR-MCNC: NEGATIVE — SIGNIFICANT CHANGE UP
BUN SERPL-MCNC: 6 MG/DL — LOW (ref 7–18)
BUN SERPL-MCNC: 6 MG/DL — LOW (ref 7–18)
CALCIUM SERPL-MCNC: 8 MG/DL — LOW (ref 8.4–10.5)
CALCIUM SERPL-MCNC: 8.9 MG/DL — SIGNIFICANT CHANGE UP (ref 8.4–10.5)
CHLORIDE SERPL-SCNC: 104 MMOL/L — SIGNIFICANT CHANGE UP (ref 96–108)
CHLORIDE SERPL-SCNC: 107 MMOL/L — SIGNIFICANT CHANGE UP (ref 96–108)
CK SERPL-CCNC: 143 U/L — SIGNIFICANT CHANGE UP (ref 21–215)
CO2 SERPL-SCNC: 25 MMOL/L — SIGNIFICANT CHANGE UP (ref 22–31)
CO2 SERPL-SCNC: 27 MMOL/L — SIGNIFICANT CHANGE UP (ref 22–31)
COCAINE METAB.OTHER UR-MCNC: POSITIVE
COLOR SPEC: YELLOW — SIGNIFICANT CHANGE UP
CREAT SERPL-MCNC: 0.84 MG/DL — SIGNIFICANT CHANGE UP (ref 0.5–1.3)
CREAT SERPL-MCNC: 1.01 MG/DL — SIGNIFICANT CHANGE UP (ref 0.5–1.3)
DIFF PNL FLD: NEGATIVE — SIGNIFICANT CHANGE UP
EOSINOPHIL # BLD AUTO: 0.04 K/UL — SIGNIFICANT CHANGE UP (ref 0–0.5)
EOSINOPHIL NFR BLD AUTO: 1.4 % — SIGNIFICANT CHANGE UP (ref 0–6)
ETHANOL SERPL-MCNC: <3 MG/DL — SIGNIFICANT CHANGE UP (ref 0–10)
GLUCOSE SERPL-MCNC: 127 MG/DL — HIGH (ref 70–99)
GLUCOSE SERPL-MCNC: 130 MG/DL — HIGH (ref 70–99)
GLUCOSE UR QL: NEGATIVE — SIGNIFICANT CHANGE UP
HCT VFR BLD CALC: 35.4 % — SIGNIFICANT CHANGE UP (ref 34.5–45)
HGB BLD-MCNC: 11.7 G/DL — SIGNIFICANT CHANGE UP (ref 11.5–15.5)
IMM GRANULOCYTES NFR BLD AUTO: 0 % — SIGNIFICANT CHANGE UP (ref 0–1.5)
INR BLD: 1.23 RATIO — HIGH (ref 0.88–1.16)
KETONES UR-MCNC: NEGATIVE — SIGNIFICANT CHANGE UP
LACTATE SERPL-SCNC: 0.7 MMOL/L — SIGNIFICANT CHANGE UP (ref 0.7–2)
LACTATE SERPL-SCNC: 5 MMOL/L — CRITICAL HIGH (ref 0.7–2)
LEUKOCYTE ESTERASE UR-ACNC: NEGATIVE — SIGNIFICANT CHANGE UP
LYMPHOCYTES # BLD AUTO: 0.78 K/UL — LOW (ref 1–3.3)
LYMPHOCYTES # BLD AUTO: 28 % — SIGNIFICANT CHANGE UP (ref 13–44)
MAGNESIUM SERPL-MCNC: 1.9 MG/DL — SIGNIFICANT CHANGE UP (ref 1.6–2.6)
MAGNESIUM SERPL-MCNC: 1.9 MG/DL — SIGNIFICANT CHANGE UP (ref 1.6–2.6)
MCHC RBC-ENTMCNC: 31.9 PG — SIGNIFICANT CHANGE UP (ref 27–34)
MCHC RBC-ENTMCNC: 33.1 GM/DL — SIGNIFICANT CHANGE UP (ref 32–36)
MCV RBC AUTO: 96.5 FL — SIGNIFICANT CHANGE UP (ref 80–100)
METHADONE UR-MCNC: NEGATIVE — SIGNIFICANT CHANGE UP
MONOCYTES # BLD AUTO: 0.2 K/UL — SIGNIFICANT CHANGE UP (ref 0–0.9)
MONOCYTES NFR BLD AUTO: 7.2 % — SIGNIFICANT CHANGE UP (ref 2–14)
NEUTROPHILS # BLD AUTO: 1.75 K/UL — LOW (ref 1.8–7.4)
NEUTROPHILS NFR BLD AUTO: 62.7 % — SIGNIFICANT CHANGE UP (ref 43–77)
NITRITE UR-MCNC: NEGATIVE — SIGNIFICANT CHANGE UP
NRBC # BLD: 0 /100 WBCS — SIGNIFICANT CHANGE UP (ref 0–0)
OPIATES UR-MCNC: NEGATIVE — SIGNIFICANT CHANGE UP
PCP SPEC-MCNC: SIGNIFICANT CHANGE UP
PCP UR-MCNC: NEGATIVE — SIGNIFICANT CHANGE UP
PH UR: 6 — SIGNIFICANT CHANGE UP (ref 5–8)
PHOSPHATE SERPL-MCNC: 2.2 MG/DL — LOW (ref 2.5–4.5)
PLATELET # BLD AUTO: 81 K/UL — LOW (ref 150–400)
POTASSIUM SERPL-MCNC: 3.2 MMOL/L — LOW (ref 3.5–5.3)
POTASSIUM SERPL-MCNC: 3.3 MMOL/L — LOW (ref 3.5–5.3)
POTASSIUM SERPL-SCNC: 3.2 MMOL/L — LOW (ref 3.5–5.3)
POTASSIUM SERPL-SCNC: 3.3 MMOL/L — LOW (ref 3.5–5.3)
PROT SERPL-MCNC: 6.7 G/DL — SIGNIFICANT CHANGE UP (ref 6–8.3)
PROT SERPL-MCNC: 7.5 G/DL — SIGNIFICANT CHANGE UP (ref 6–8.3)
PROT UR-MCNC: NEGATIVE — SIGNIFICANT CHANGE UP
PROTHROM AB SERPL-ACNC: 14.5 SEC — HIGH (ref 10.6–13.6)
RBC # BLD: 3.67 M/UL — LOW (ref 3.8–5.2)
RBC # FLD: 13.8 % — SIGNIFICANT CHANGE UP (ref 10.3–14.5)
SALICYLATES SERPL-MCNC: 2 MG/DL — LOW (ref 2.8–20)
SARS-COV-2 RNA SPEC QL NAA+PROBE: SIGNIFICANT CHANGE UP
SODIUM SERPL-SCNC: 140 MMOL/L — SIGNIFICANT CHANGE UP (ref 135–145)
SODIUM SERPL-SCNC: 140 MMOL/L — SIGNIFICANT CHANGE UP (ref 135–145)
SP GR SPEC: 1.01 — SIGNIFICANT CHANGE UP (ref 1.01–1.02)
THC UR QL: NEGATIVE — SIGNIFICANT CHANGE UP
UROBILINOGEN FLD QL: NEGATIVE — SIGNIFICANT CHANGE UP
WBC # BLD: 2.79 K/UL — LOW (ref 3.8–10.5)
WBC # FLD AUTO: 2.79 K/UL — LOW (ref 3.8–10.5)

## 2021-01-26 PROCEDURE — 99291 CRITICAL CARE FIRST HOUR: CPT

## 2021-01-26 PROCEDURE — 71045 X-RAY EXAM CHEST 1 VIEW: CPT | Mod: 26

## 2021-01-26 RX ORDER — DICLOFENAC SODIUM 75 MG/1
1 TABLET, DELAYED RELEASE ORAL
Qty: 0 | Refills: 0 | DISCHARGE

## 2021-01-26 RX ORDER — FOLIC ACID 0.8 MG
1 TABLET ORAL DAILY
Refills: 0 | Status: DISCONTINUED | OUTPATIENT
Start: 2021-01-26 | End: 2021-01-30

## 2021-01-26 RX ORDER — CEFEPIME 1 G/1
1000 INJECTION, POWDER, FOR SOLUTION INTRAMUSCULAR; INTRAVENOUS ONCE
Refills: 0 | Status: COMPLETED | OUTPATIENT
Start: 2021-01-26 | End: 2021-01-26

## 2021-01-26 RX ORDER — CEFEPIME 1 G/1
2000 INJECTION, POWDER, FOR SOLUTION INTRAMUSCULAR; INTRAVENOUS EVERY 8 HOURS
Refills: 0 | Status: DISCONTINUED | OUTPATIENT
Start: 2021-01-26 | End: 2021-01-28

## 2021-01-26 RX ORDER — POTASSIUM PHOSPHATE, MONOBASIC POTASSIUM PHOSPHATE, DIBASIC 236; 224 MG/ML; MG/ML
30 INJECTION, SOLUTION INTRAVENOUS ONCE
Refills: 0 | Status: COMPLETED | OUTPATIENT
Start: 2021-01-26 | End: 2021-01-26

## 2021-01-26 RX ORDER — FLUTICASONE PROPIONATE 50 MCG
1 SPRAY, SUSPENSION NASAL
Qty: 0 | Refills: 0 | DISCHARGE

## 2021-01-26 RX ORDER — KETOTIFEN FUMARATE 0.34 MG/ML
1 SOLUTION OPHTHALMIC
Qty: 0 | Refills: 0 | DISCHARGE

## 2021-01-26 RX ORDER — ENOXAPARIN SODIUM 100 MG/ML
40 INJECTION SUBCUTANEOUS DAILY
Refills: 0 | Status: DISCONTINUED | OUTPATIENT
Start: 2021-01-26 | End: 2021-01-30

## 2021-01-26 RX ORDER — SODIUM CHLORIDE 9 MG/ML
1000 INJECTION INTRAMUSCULAR; INTRAVENOUS; SUBCUTANEOUS
Refills: 0 | Status: DISCONTINUED | OUTPATIENT
Start: 2021-01-26 | End: 2021-01-26

## 2021-01-26 RX ORDER — SODIUM CHLORIDE 9 MG/ML
1000 INJECTION INTRAMUSCULAR; INTRAVENOUS; SUBCUTANEOUS
Refills: 0 | Status: DISCONTINUED | OUTPATIENT
Start: 2021-01-26 | End: 2021-01-27

## 2021-01-26 RX ORDER — PANTOPRAZOLE SODIUM 20 MG/1
40 TABLET, DELAYED RELEASE ORAL
Refills: 0 | Status: DISCONTINUED | OUTPATIENT
Start: 2021-01-26 | End: 2021-01-30

## 2021-01-26 RX ORDER — IBUPROFEN 200 MG
1 TABLET ORAL
Qty: 0 | Refills: 0 | DISCHARGE

## 2021-01-26 RX ORDER — MAGNESIUM SULFATE 500 MG/ML
1 VIAL (ML) INJECTION ONCE
Refills: 0 | Status: COMPLETED | OUTPATIENT
Start: 2021-01-26 | End: 2021-01-26

## 2021-01-26 RX ORDER — MONTELUKAST 4 MG/1
1 TABLET, CHEWABLE ORAL
Qty: 0 | Refills: 0 | DISCHARGE

## 2021-01-26 RX ORDER — LEVETIRACETAM 250 MG/1
1000 TABLET, FILM COATED ORAL ONCE
Refills: 0 | Status: COMPLETED | OUTPATIENT
Start: 2021-01-26 | End: 2021-01-26

## 2021-01-26 RX ORDER — ACETAMINOPHEN 500 MG
650 TABLET ORAL EVERY 6 HOURS
Refills: 0 | Status: DISCONTINUED | OUTPATIENT
Start: 2021-01-26 | End: 2021-01-29

## 2021-01-26 RX ORDER — LIDOCAINE 4 G/100G
2 CREAM TOPICAL DAILY
Refills: 0 | Status: DISCONTINUED | OUTPATIENT
Start: 2021-01-26 | End: 2021-01-30

## 2021-01-26 RX ORDER — ONDANSETRON 8 MG/1
4 TABLET, FILM COATED ORAL
Refills: 0 | Status: DISCONTINUED | OUTPATIENT
Start: 2021-01-26 | End: 2021-01-30

## 2021-01-26 RX ORDER — POTASSIUM CHLORIDE 20 MEQ
40 PACKET (EA) ORAL
Refills: 0 | Status: COMPLETED | OUTPATIENT
Start: 2021-01-26 | End: 2021-01-26

## 2021-01-26 RX ORDER — ENOXAPARIN SODIUM 100 MG/ML
40 INJECTION SUBCUTANEOUS
Refills: 0 | Status: DISCONTINUED | OUTPATIENT
Start: 2021-01-26 | End: 2021-01-26

## 2021-01-26 RX ORDER — THIAMINE MONONITRATE (VIT B1) 100 MG
500 TABLET ORAL THREE TIMES A DAY
Refills: 0 | Status: COMPLETED | OUTPATIENT
Start: 2021-01-26 | End: 2021-01-29

## 2021-01-26 RX ADMIN — Medication 100 GRAM(S): at 19:11

## 2021-01-26 RX ADMIN — LEVETIRACETAM 400 MILLIGRAM(S): 250 TABLET, FILM COATED ORAL at 21:41

## 2021-01-26 RX ADMIN — SODIUM CHLORIDE 150 MILLILITER(S): 9 INJECTION INTRAMUSCULAR; INTRAVENOUS; SUBCUTANEOUS at 12:52

## 2021-01-26 RX ADMIN — Medication 40 MILLIEQUIVALENT(S): at 17:17

## 2021-01-26 RX ADMIN — POTASSIUM PHOSPHATE, MONOBASIC POTASSIUM PHOSPHATE, DIBASIC 83.33 MILLIMOLE(S): 236; 224 INJECTION, SOLUTION INTRAVENOUS at 19:46

## 2021-01-26 RX ADMIN — Medication 650 MILLIGRAM(S): at 21:41

## 2021-01-26 RX ADMIN — Medication 105 MILLIGRAM(S): at 22:53

## 2021-01-26 RX ADMIN — CEFEPIME 100 MILLIGRAM(S): 1 INJECTION, POWDER, FOR SOLUTION INTRAMUSCULAR; INTRAVENOUS at 22:54

## 2021-01-26 RX ADMIN — Medication 2 MILLIGRAM(S): at 13:47

## 2021-01-26 RX ADMIN — CEFEPIME 1000 MILLIGRAM(S): 1 INJECTION, POWDER, FOR SOLUTION INTRAMUSCULAR; INTRAVENOUS at 17:19

## 2021-01-26 RX ADMIN — CEFEPIME 100 MILLIGRAM(S): 1 INJECTION, POWDER, FOR SOLUTION INTRAMUSCULAR; INTRAVENOUS at 15:36

## 2021-01-26 RX ADMIN — Medication 40 MILLIEQUIVALENT(S): at 15:42

## 2021-01-26 RX ADMIN — SODIUM CHLORIDE 150 MILLILITER(S): 9 INJECTION INTRAMUSCULAR; INTRAVENOUS; SUBCUTANEOUS at 20:30

## 2021-01-26 RX ADMIN — LIDOCAINE 2 PATCH: 4 CREAM TOPICAL at 21:41

## 2021-01-26 NOTE — ED PROVIDER NOTE - CARE PLAN
Principal Discharge DX:	Seizure  Secondary Diagnosis:	Benzodiazepine withdrawal  Secondary Diagnosis:	PNA (pneumonia)  Secondary Diagnosis:	Hypokalemia  Secondary Diagnosis:	Alcohol withdrawal

## 2021-01-26 NOTE — H&P ADULT - NSHPPHYSICALEXAM_GEN_ALL_CORE
PHYSICAL EXAM:  GENERAL: lethargic, limited exam  HEAD:  Atraumatic, Normocephalic  EYES: EOMI, PERRLA, conjunctiva and sclera clear  NECK: Supple, No JVD  CHEST/LUNG:  No wheeze; No crackles; No accessory muscles used  HEART: s1,s2,  No murmurs;   ABDOMEN: Soft, Nontender, Nondistended; Bowel sounds present; No guarding  EXTREMITIES:  2+ Peripheral Pulses, No cyanosis or edema  PSYCH: AAOx3  NEUROLOGY: no-focal deficit, neuro grossly intact  SKIN: No rashes or lesions

## 2021-01-26 NOTE — H&P ADULT - HISTORY OF PRESENT ILLNESS
61 y.o. female claims she have a h/o seizure, not on any meds., last seizure 7/20.  Pt went to her PMD for Xanax & Narco for refill.  Last dose 1/22. Pt drinks alcohol, last drank past few days.  Pt drinks few beer.  Denies binge drinking.  Reportedly pt had seizure @ PMD's off., pt has no recollection of the event, no incontinence Pt is 61 year old female  hx of seizure( not on any meds) HCV ?cirrhosis, newly diagnosed COPD not on inhalers, chronic migraines and anxiety on Xanax admitted with new onset witnessed seizure.  hx of seizure, not on any meds. Seizure witnessed with generalized tonic clonic movements 3-4 min and had another episode in ed. as per  she had 3-4 seizure in past but walked out AMA during last admission. Hx somewhat limited due to patient's mental status- medication effect vs postictal state. Alcohol consumption unclear, states she drinks every day 4-6 beers with last beer last night 1/26 but difficult to obtain further information about chronic consumption.  Of note ran out of Xanax 2 days ago. As per  collateral information obtained from : patient has been drinking heavily for many years at least a six pack of beer a day or every other day. As per  pt has been space out on and off for last 6-8 months and has been having severe panic attacks. Pt denies any drug abuse or any other acute finding. Pt was out of her xanax for 1 week.

## 2021-01-26 NOTE — ED PROVIDER NOTE - OBJECTIVE STATEMENT
61 y.o. female claims she have a h/o seizure, not on any meds., last seizure 7/20.  Pt went to her PMD for Xanax & Narco for refill.  Last dose 1/22. Pt drinks alcohol, last drank past few days.  Pt drinks few beer.  Denies binge drinking.  Reportedly pt had seizure @ PMD's off., pt has no recollection of the event, no incontinence.

## 2021-01-26 NOTE — ED ADULT TRIAGE NOTE - CHIEF COMPLAINT QUOTE
biba sent from the doctor's office for seizure activity . pt states went to see her doctor today for prescriptions of her Xanax and pain pill

## 2021-01-26 NOTE — H&P ADULT - NSICDXPASTMEDICALHX_GEN_ALL_CORE_FT
PAST MEDICAL HISTORY:  Anxiety     ETOH abuse     Hepatitis C     RA (rheumatoid arthritis)     Right knee pain

## 2021-01-26 NOTE — ED ADULT TRIAGE NOTE - NS ED NURSE BANDS TYPE

## 2021-01-26 NOTE — H&P ADULT - PROBLEM SELECTOR PLAN 2
pT has been drinking more as per    last drink last night 1/25   3-4 bears   will start ciwa protocol  ativan standing q6 2mg  ativan prn 2mg   thiamin iv  monitor ciwa daily Toxic metabolic encephalopathy- due to medication effect vs post ictal state, possible alcohol withdrawal-   EtOH level negative,  f/u  CT head

## 2021-01-26 NOTE — H&P ADULT - PROBLEM SELECTOR PLAN 5
full code RISK                                                          Points  [] Previous VTE                                           3  [] Thrombophilia                                        2  [] Lower limb paralysis                              2   [] Current Cancer                                       2   [x] Immobilization > 24 hrs                        1  [] ICU/CCU stay > 24 hours                       1  [x] Age > 60                                                   1    sc lovenox Pt on xanax 1 mg oral qd  will resume

## 2021-01-26 NOTE — ED PROVIDER NOTE - PROGRESS NOTE DETAILS
As per RN, pt had a grand mal in ED. Will give Ativan, admission.   Pt's  [facility] [phone].  Pt was drinking beers since Fri., Pt has been having blank stares 2-3x/day for past few days, shaking, no incontinence.  Pt also vomited few days ago 2/2 seizure. Pt with appears to be Benzo withdraw seizure, concern for asp. PNA, case d/w Dr. Castellanos, will admit

## 2021-01-26 NOTE — H&P ADULT - PROBLEM SELECTOR PLAN 4
RISK                                                          Points  [] Previous VTE                                           3  [] Thrombophilia                                        2  [] Lower limb paralysis                              2   [] Current Cancer                                       2   [x] Immobilization > 24 hrs                        1  [] ICU/CCU stay > 24 hours                       1  [x] Age > 60                                                   1    sc lovenox pT HAS BILATERAL KNEE pain   pt is for knee replacment surgery soon  will give lidocain patches fornow

## 2021-01-26 NOTE — ED ADULT NURSE NOTE - ED STAT RN HANDOFF DETAILS 3
Pt is alert and oriented x3. Pt c/o pain in the right lower extremity. NP made aware. Pt is medicated as ordered. Safety precaution maintained. Pt is transferred to [facility] in stable condition.

## 2021-01-26 NOTE — H&P ADULT - PROBLEM SELECTOR PLAN 1
Pt p/w seizure at dr office  on exam has some tongue bites and lethargic s/p ativan in ed  pt on no seizure meds  before this last seizure was in july 2020  started on standing ativan   benzodiazepin withdrwl seizure ? vs seizure foci in brain  vs alcohol withdrawl seizure  Neurology Dr. KUHN Pt p/w seizure at dr office  on exam has some tongue bites and lethargic s/p ativan in ed  pt on no seizure meds  before this last seizure was in july 2020  s/p keppra loading   started on standing ativan   benzodiazepin withdrwl seizure ? vs seizure foci in brain  vs alcohol withdrawl seizure  neurocheck   Neurology Dr. KUNH

## 2021-01-26 NOTE — ED PROVIDER NOTE - ENMT, MLM
Airway patent, Nasal mucosa clear. Mouth with normal mucosa. Throat has no vesicles, no oropharyngeal exudates and uvula is midline. tongue biting

## 2021-01-26 NOTE — ED ADULT NURSE NOTE - NSIMPLEMENTINTERV_GEN_ALL_ED
Implemented All Fall with Harm Risk Interventions:  Rover to call system. Call bell, personal items and telephone within reach. Instruct patient to call for assistance. Room bathroom lighting operational. Non-slip footwear when patient is off stretcher. Physically safe environment: no spills, clutter or unnecessary equipment. Stretcher in lowest position, wheels locked, appropriate side rails in place. Provide visual cue, wrist band, yellow gown, etc. Monitor gait and stability. Monitor for mental status changes and reorient to person, place, and time. Review medications for side effects contributing to fall risk. Reinforce activity limits and safety measures with patient and family. Provide visual clues: red socks.

## 2021-01-26 NOTE — H&P ADULT - ASSESSMENT
Pt is 61 year old female  hx of seizure( not on any meds) HCV ?cirrhosis, newly diagnosed COPD not on inhalers, chronic migraines and anxiety on Xanax admitted with new onset witnessed seizure.  Admitted for new onset seizure

## 2021-01-26 NOTE — H&P ADULT - PROBLEM SELECTOR PLAN 3
pT HAS BILATERAL KNEE pain   pt is for knee replacment surgery soon  will give lidocain patches fornow pT has been drinking more as per    last drink last night 1/25   3-4 bears   will start ciwa protocol  ativan standing q6 2mg  ativan prn 2mg   thiamin iv  monitor ciwa daily

## 2021-01-26 NOTE — ED ADULT NURSE REASSESSMENT NOTE - NS ED NURSE REASSESS COMMENT FT1
Pt having bedside CXR done tech reports pt with seizure like movement. Pt currently post ictal MD FANY Gagnon aware pt placed on cardiac monitor, F.S done and v/s taken will continue to monitor. Pt moved closer to nsg station. Bloody sputum noticed on face

## 2021-01-26 NOTE — H&P ADULT - NSHPSOCIALHISTORY_GEN_ALL_CORE
no smoking, drinking 3-4 bear every other day  last drink was last night 3-4 bears 10 cigarets a day since 40 yrs, smoking,   drinking 6-8  bear every other day for 25-30 yr  last drink was last night 6 bears

## 2021-01-27 DIAGNOSIS — D64.9 ANEMIA, UNSPECIFIED: ICD-10-CM

## 2021-01-27 DIAGNOSIS — J69.0 PNEUMONITIS DUE TO INHALATION OF FOOD AND VOMIT: ICD-10-CM

## 2021-01-27 DIAGNOSIS — Z02.9 ENCOUNTER FOR ADMINISTRATIVE EXAMINATIONS, UNSPECIFIED: ICD-10-CM

## 2021-01-27 LAB
A1C WITH ESTIMATED AVERAGE GLUCOSE RESULT: 4.6 % — SIGNIFICANT CHANGE UP (ref 4–5.6)
ALBUMIN SERPL ELPH-MCNC: 2.5 G/DL — LOW (ref 3.5–5)
ALP SERPL-CCNC: 91 U/L — SIGNIFICANT CHANGE UP (ref 40–120)
ALT FLD-CCNC: 15 U/L DA — SIGNIFICANT CHANGE UP (ref 10–60)
ANION GAP SERPL CALC-SCNC: 5 MMOL/L — SIGNIFICANT CHANGE UP (ref 5–17)
ANISOCYTOSIS BLD QL: SLIGHT — SIGNIFICANT CHANGE UP
AST SERPL-CCNC: 39 U/L — SIGNIFICANT CHANGE UP (ref 10–40)
BASOPHILS # BLD AUTO: 0.02 K/UL — SIGNIFICANT CHANGE UP (ref 0–0.2)
BASOPHILS NFR BLD AUTO: 0.7 % — SIGNIFICANT CHANGE UP (ref 0–2)
BILIRUB SERPL-MCNC: 1.7 MG/DL — HIGH (ref 0.2–1.2)
BUN SERPL-MCNC: 7 MG/DL — SIGNIFICANT CHANGE UP (ref 7–18)
BURR CELLS BLD QL SMEAR: PRESENT — SIGNIFICANT CHANGE UP
CALCIUM SERPL-MCNC: 7.4 MG/DL — LOW (ref 8.4–10.5)
CHLORIDE SERPL-SCNC: 111 MMOL/L — HIGH (ref 96–108)
CHOLEST SERPL-MCNC: 68 MG/DL — SIGNIFICANT CHANGE UP
CO2 SERPL-SCNC: 24 MMOL/L — SIGNIFICANT CHANGE UP (ref 22–31)
CREAT SERPL-MCNC: 0.8 MG/DL — SIGNIFICANT CHANGE UP (ref 0.5–1.3)
CULTURE RESULTS: NO GROWTH — SIGNIFICANT CHANGE UP
EOSINOPHIL # BLD AUTO: 0.05 K/UL — SIGNIFICANT CHANGE UP (ref 0–0.5)
EOSINOPHIL NFR BLD AUTO: 1.7 % — SIGNIFICANT CHANGE UP (ref 0–6)
ESTIMATED AVERAGE GLUCOSE: 85 MG/DL — SIGNIFICANT CHANGE UP (ref 68–114)
FERRITIN SERPL-MCNC: 66 NG/ML — SIGNIFICANT CHANGE UP (ref 15–150)
FOLATE SERPL-MCNC: 12.3 NG/ML — SIGNIFICANT CHANGE UP
GLUCOSE SERPL-MCNC: 89 MG/DL — SIGNIFICANT CHANGE UP (ref 70–99)
HCT VFR BLD CALC: 28.1 % — LOW (ref 34.5–45)
HCT VFR BLD CALC: 28.5 % — LOW (ref 34.5–45)
HCV AB S/CO SERPL IA: 13.84 S/CO — HIGH (ref 0–0.99)
HCV AB SERPL-IMP: REACTIVE
HDLC SERPL-MCNC: 38 MG/DL — LOW
HGB BLD-MCNC: 9.1 G/DL — LOW (ref 11.5–15.5)
HGB BLD-MCNC: 9.5 G/DL — LOW (ref 11.5–15.5)
IMM GRANULOCYTES NFR BLD AUTO: 0.3 % — SIGNIFICANT CHANGE UP (ref 0–1.5)
IRON SATN MFR SERPL: 256 UG/DL — HIGH (ref 40–150)
IRON SATN MFR SERPL: 86 % — HIGH (ref 15–50)
LDH SERPL L TO P-CCNC: 180 U/L — SIGNIFICANT CHANGE UP (ref 120–225)
LIPID PNL WITH DIRECT LDL SERPL: 19 MG/DL — SIGNIFICANT CHANGE UP
LYMPHOCYTES # BLD AUTO: 1.47 K/UL — SIGNIFICANT CHANGE UP (ref 1–3.3)
LYMPHOCYTES # BLD AUTO: 50.2 % — HIGH (ref 13–44)
MAGNESIUM SERPL-MCNC: 2.1 MG/DL — SIGNIFICANT CHANGE UP (ref 1.6–2.6)
MANUAL SMEAR VERIFICATION: SIGNIFICANT CHANGE UP
MCHC RBC-ENTMCNC: 31.7 PG — SIGNIFICANT CHANGE UP (ref 27–34)
MCHC RBC-ENTMCNC: 32.4 GM/DL — SIGNIFICANT CHANGE UP (ref 32–36)
MCHC RBC-ENTMCNC: 32.8 PG — SIGNIFICANT CHANGE UP (ref 27–34)
MCHC RBC-ENTMCNC: 33.3 GM/DL — SIGNIFICANT CHANGE UP (ref 32–36)
MCV RBC AUTO: 97.9 FL — SIGNIFICANT CHANGE UP (ref 80–100)
MCV RBC AUTO: 98.3 FL — SIGNIFICANT CHANGE UP (ref 80–100)
MONOCYTES # BLD AUTO: 0.34 K/UL — SIGNIFICANT CHANGE UP (ref 0–0.9)
MONOCYTES NFR BLD AUTO: 11.6 % — SIGNIFICANT CHANGE UP (ref 2–14)
NEUTROPHILS # BLD AUTO: 1.04 K/UL — LOW (ref 1.8–7.4)
NEUTROPHILS NFR BLD AUTO: 35.5 % — LOW (ref 43–77)
NON HDL CHOLESTEROL: 30 MG/DL — SIGNIFICANT CHANGE UP
NRBC # BLD: 0 /100 WBCS — SIGNIFICANT CHANGE UP (ref 0–0)
NRBC # BLD: 0 /100 WBCS — SIGNIFICANT CHANGE UP (ref 0–0)
NT-PROBNP SERPL-SCNC: 350 PG/ML — HIGH (ref 0–125)
PHOSPHATE SERPL-MCNC: 4 MG/DL — SIGNIFICANT CHANGE UP (ref 2.5–4.5)
PLAT MORPH BLD: NORMAL — SIGNIFICANT CHANGE UP
PLATELET # BLD AUTO: 62 K/UL — LOW (ref 150–400)
PLATELET # BLD AUTO: 66 K/UL — LOW (ref 150–400)
PLATELET COUNT - ESTIMATE: ABNORMAL
POIKILOCYTOSIS BLD QL AUTO: SLIGHT — SIGNIFICANT CHANGE UP
POTASSIUM SERPL-MCNC: 4 MMOL/L — SIGNIFICANT CHANGE UP (ref 3.5–5.3)
POTASSIUM SERPL-SCNC: 4 MMOL/L — SIGNIFICANT CHANGE UP (ref 3.5–5.3)
PROT SERPL-MCNC: 5.5 G/DL — LOW (ref 6–8.3)
RBC # BLD: 2.87 M/UL — LOW (ref 3.8–5.2)
RBC # BLD: 2.9 M/UL — LOW (ref 3.8–5.2)
RBC # FLD: 14.2 % — SIGNIFICANT CHANGE UP (ref 10.3–14.5)
RBC # FLD: 14.2 % — SIGNIFICANT CHANGE UP (ref 10.3–14.5)
RBC BLD AUTO: ABNORMAL
SARS-COV-2 IGG SERPL QL IA: NEGATIVE — SIGNIFICANT CHANGE UP
SARS-COV-2 IGM SERPL IA-ACNC: <0.1 INDEX — SIGNIFICANT CHANGE UP
SCHISTOCYTES BLD QL AUTO: SLIGHT — SIGNIFICANT CHANGE UP
SODIUM SERPL-SCNC: 140 MMOL/L — SIGNIFICANT CHANGE UP (ref 135–145)
SPECIMEN SOURCE: SIGNIFICANT CHANGE UP
SPHEROCYTES BLD QL SMEAR: SLIGHT — SIGNIFICANT CHANGE UP
TARGETS BLD QL SMEAR: SLIGHT — SIGNIFICANT CHANGE UP
TIBC SERPL-MCNC: 296 UG/DL — SIGNIFICANT CHANGE UP (ref 250–450)
TRIGL SERPL-MCNC: 55 MG/DL — SIGNIFICANT CHANGE UP
TROPONIN I SERPL-MCNC: 0.02 NG/ML — SIGNIFICANT CHANGE UP (ref 0–0.04)
TSH SERPL-MCNC: 1.44 UU/ML — SIGNIFICANT CHANGE UP (ref 0.34–4.82)
UIBC SERPL-MCNC: 40 UG/DL — LOW (ref 110–370)
URATE SERPL-MCNC: 6.1 MG/DL — SIGNIFICANT CHANGE UP (ref 2.5–7)
VIT B12 SERPL-MCNC: 793 PG/ML — SIGNIFICANT CHANGE UP (ref 232–1245)
WBC # BLD: 2.64 K/UL — LOW (ref 3.8–10.5)
WBC # BLD: 2.93 K/UL — LOW (ref 3.8–10.5)
WBC # FLD AUTO: 2.64 K/UL — LOW (ref 3.8–10.5)
WBC # FLD AUTO: 2.93 K/UL — LOW (ref 3.8–10.5)

## 2021-01-27 PROCEDURE — 95819 EEG AWAKE AND ASLEEP: CPT | Mod: 26

## 2021-01-27 PROCEDURE — 99233 SBSQ HOSP IP/OBS HIGH 50: CPT

## 2021-01-27 PROCEDURE — 70450 CT HEAD/BRAIN W/O DYE: CPT | Mod: 26

## 2021-01-27 RX ORDER — ALPRAZOLAM 0.25 MG
1 TABLET ORAL DAILY
Refills: 0 | Status: DISCONTINUED | OUTPATIENT
Start: 2021-01-27 | End: 2021-01-27

## 2021-01-27 RX ORDER — PREGABALIN 225 MG/1
1000 CAPSULE ORAL DAILY
Refills: 0 | Status: DISCONTINUED | OUTPATIENT
Start: 2021-01-27 | End: 2021-01-30

## 2021-01-27 RX ORDER — NICOTINE POLACRILEX 2 MG
1 GUM BUCCAL DAILY
Refills: 0 | Status: DISCONTINUED | OUTPATIENT
Start: 2021-01-27 | End: 2021-01-30

## 2021-01-27 RX ORDER — ALPRAZOLAM 0.25 MG
1 TABLET ORAL DAILY
Refills: 0 | Status: DISCONTINUED | OUTPATIENT
Start: 2021-01-28 | End: 2021-01-28

## 2021-01-27 RX ORDER — KETOROLAC TROMETHAMINE 30 MG/ML
15 SYRINGE (ML) INJECTION ONCE
Refills: 0 | Status: DISCONTINUED | OUTPATIENT
Start: 2021-01-27 | End: 2021-01-27

## 2021-01-27 RX ADMIN — LIDOCAINE 2 PATCH: 4 CREAM TOPICAL at 11:37

## 2021-01-27 RX ADMIN — LIDOCAINE 2 PATCH: 4 CREAM TOPICAL at 23:03

## 2021-01-27 RX ADMIN — Medication 2 MILLIGRAM(S): at 11:05

## 2021-01-27 RX ADMIN — ENOXAPARIN SODIUM 40 MILLIGRAM(S): 100 INJECTION SUBCUTANEOUS at 11:37

## 2021-01-27 RX ADMIN — CEFEPIME 100 MILLIGRAM(S): 1 INJECTION, POWDER, FOR SOLUTION INTRAMUSCULAR; INTRAVENOUS at 06:33

## 2021-01-27 RX ADMIN — CEFEPIME 100 MILLIGRAM(S): 1 INJECTION, POWDER, FOR SOLUTION INTRAMUSCULAR; INTRAVENOUS at 21:45

## 2021-01-27 RX ADMIN — Medication 2 MILLIGRAM(S): at 05:21

## 2021-01-27 RX ADMIN — SODIUM CHLORIDE 150 MILLILITER(S): 9 INJECTION INTRAMUSCULAR; INTRAVENOUS; SUBCUTANEOUS at 05:21

## 2021-01-27 RX ADMIN — Medication 2 MILLIGRAM(S): at 00:33

## 2021-01-27 RX ADMIN — Medication 15 MILLIGRAM(S): at 00:33

## 2021-01-27 RX ADMIN — Medication 1 PATCH: at 19:12

## 2021-01-27 RX ADMIN — Medication 1 TABLET(S): at 11:38

## 2021-01-27 RX ADMIN — LIDOCAINE 2 PATCH: 4 CREAM TOPICAL at 11:07

## 2021-01-27 RX ADMIN — Medication 105 MILLIGRAM(S): at 22:54

## 2021-01-27 RX ADMIN — Medication 1 PATCH: at 11:37

## 2021-01-27 RX ADMIN — LIDOCAINE 2 PATCH: 4 CREAM TOPICAL at 07:10

## 2021-01-27 RX ADMIN — LIDOCAINE 2 PATCH: 4 CREAM TOPICAL at 19:12

## 2021-01-27 RX ADMIN — Medication 105 MILLIGRAM(S): at 14:28

## 2021-01-27 RX ADMIN — CEFEPIME 100 MILLIGRAM(S): 1 INJECTION, POWDER, FOR SOLUTION INTRAMUSCULAR; INTRAVENOUS at 14:28

## 2021-01-27 RX ADMIN — Medication 2 MILLIGRAM(S): at 21:45

## 2021-01-27 RX ADMIN — Medication 1 MILLIGRAM(S): at 11:37

## 2021-01-27 RX ADMIN — Medication 105 MILLIGRAM(S): at 06:33

## 2021-01-27 NOTE — PROGRESS NOTE ADULT - SUBJECTIVE AND OBJECTIVE BOX
NP Note discussed with  Primary Attending    Patient is a 61y old  Female who presents with a chief complaint of seizure (2021 16:15)      INTERVAL HPI/OVERNIGHT EVENTS: Patient seen and examined at bedside. Patient laying comfortable in bed,  no new complaints    MEDICATIONS  (STANDING):  ALPRAZolam 1 milliGRAM(s) Oral daily  cefepime   IVPB 2000 milliGRAM(s) IV Intermittent every 8 hours  cyanocobalamin 1000 MICROGram(s) Oral daily  enoxaparin Injectable 40 milliGRAM(s) SubCutaneous daily  folic acid 1 milliGRAM(s) Oral daily  lidocaine   Patch 2 Patch Transdermal daily  LORazepam   Injectable 2 milliGRAM(s) IV Push every 12 hours  multivitamin 1 Tablet(s) Oral daily  nicotine -  14 mG/24Hr(s) Patch 1 patch Transdermal daily  ondansetron Injectable 4 milliGRAM(s) IV Push two times a day  pantoprazole    Tablet 40 milliGRAM(s) Oral before breakfast  thiamine IVPB 500 milliGRAM(s) IV Intermittent three times a day    MEDICATIONS  (PRN):  acetaminophen  Suppository .. 650 milliGRAM(s) Rectal every 6 hours PRN Temp greater or equal to 38C (100.4F), Mild Pain (1 - 3)  LORazepam   Injectable 2 milliGRAM(s) IV Push every 2 hours PRN CIWA-Ar score increase by 2 points and a total score of 7 or less      __________________________________________________  REVIEW OF SYSTEMS:    CONSTITUTIONAL: No fever,   EYES: no acute visual disturbances  NECK: No pain or stiffness  RESPIRATORY: No cough; No shortness of breath  CARDIOVASCULAR: No chest pain, no palpitations  GASTROINTESTINAL: No pain. No nausea or vomiting; No diarrhea   NEUROLOGICAL: No headache or numbness, no tremors  MUSCULOSKELETAL: No joint pain, no muscle pain  GENITOURINARY: no dysuria, no frequency, no hesitancy  PSYCHIATRY: no depression , anxiety  ALL OTHER  ROS negative        Vital Signs Last 24 Hrs  T(C): 36.6 (2021 06:07), Max: 38.1 (2021 20:19)  T(F): 97.8 (2021 06:07), Max: 100.5 (2021 20:19)  HR: 88 (2021 06:07) (80 - 100)  BP: 121/57 (2021 06:07) (112/76 - 143/68)  BP(mean): --  RR: 18 (2021 06:07) (18 - 19)  SpO2: 98% (2021 06:07) (95% - 98%)    ________________________________________________  PHYSICAL EXAM:  GENERAL: NAD  HEENT: Normocephalic;  conjunctivae and sclerae clear; moist mucous membranes;   NECK : supple  CHEST/LUNG: Clear to auscultation bilaterally with good air entry   HEART: S1 S2  regular; no murmurs, gallops or rubs  ABDOMEN: Soft, Nontender, Nondistended; Bowel sounds present  EXTREMITIES: no cyanosis; no edema; no calf tenderness  SKIN: warm and dry; no rash  NERVOUS SYSTEM:  Awake and alert; Oriented  to place, person and time ; no new deficits    _________________________________________________  LABS:                        9.5    2.64  )-----------( 62       ( 2021 10:55 )             28.5     01-    140  |  111<H>  |  7   ----------------------------<  89  4.0   |  24  |  0.80    Ca    7.4<L>      2021 06:23  Phos  4.0       Mg     2.1         TPro  5.5<L>  /  Alb  2.5<L>  /  TBili  1.7<H>  /  DBili  x   /  AST  39  /  ALT  15  /  AlkPhos  91  -    PT/INR - ( 2021 13:13 )   PT: 14.5 sec;   INR: 1.23 ratio         PTT - ( 2021 13:13 )  PTT:34.1 sec  Urinalysis Basic - ( 2021 22:58 )    Color: Yellow / Appearance: Clear / S.015 / pH: x  Gluc: x / Ketone: Negative  / Bili: Negative / Urobili: Negative   Blood: x / Protein: Negative / Nitrite: Negative   Leuk Esterase: Negative / RBC: x / WBC x   Sq Epi: x / Non Sq Epi: x / Bacteria: x    CAPILLARY BLOOD GLUCOSE    RADIOLOGY & ADDITIONAL TESTS:  < from: CT Head No Cont (21 @ 01:21) >  EXAM:  CT BRAIN                            PROCEDURE DATE:  2021          INTERPRETATION:  CLINICAL INFORMATION:  seizure    TECHNIQUE:  Axial CT images were acquired through the head.  Intravenous contrast: None  Two-dimensional reformats were generated.    COMPARISON STUDY: CT head 2020.    FINDINGS:    There is no CT evidence of acute intracranial hemorrhage, mass effect, midline shift, or acute, large transcortical infarct.  Few scattered periventricular and subcortical white matter hypodensities are nonspecific, although likely on the basis of mild chronic microangiopathy. The ventricles and sulci are normal in size and configuration. The basal cisterns are patent.    There are atherosclerotic calcifications of the carotid siphons.    The mastoid air cells and middle ear cavities are grossly clear. The visualized paranasal sinuses are well aerated.    The calvarium and skull base are grossly intact.    IMPRESSION:  No acute intracranial hemorrhage or mass effect.    < end of copied text >  < from: Xray Chest 1 View-PORTABLE IMMEDIATE (Xray Chest 1 View-PORTABLE IMMEDIATE .) (21 @ 13:53) >    EXAM:  XR CHEST PORTABLE IMMED 1V                            PROCEDURE DATE:  2021          INTERPRETATION:  CLINICAL INDICATION: 61 years  Female with seizure.    COMPARISON: 2020    AP view of the chest demonstrates mild pulmonary vascular prominence. There is a patchy right lower lobe infiltrate. There is no pleural effusion. There is no pneumothorax.    The heart is enlarged. The mediastinum and didier cannot be assessed due to rotation.    There is diffuse osteopenia.    IMPRESSION:    Mild pulmonary vascular congestion.    Patchy right lower lobe infiltrate.    < end of copied text >  ee    EEG Impression / Clinical Correlate:  Abnormal EEG study.  Mild nonspecific diffuse or multifocal cerebral dysfunction.   Excess diffuse beta activity may be seen with medication use such as benzodiazepines or barbiturates.  No epileptiform pattern or seizure seen.    Imaging  Reviewed:  YES    Consultant(s) Notes Reviewed:   YES      Plan of care was discussed with patient and /or primary care giver; all questions and concerns were addressed

## 2021-01-27 NOTE — PROGRESS NOTE ADULT - PROBLEM SELECTOR PLAN 2
secondary Toxic metabolic encephalopathy- due to medication effect vs post ictal state, possible alcohol withdrawal-   EtOH level negative  -CT head unremarkable

## 2021-01-27 NOTE — CONSULT NOTE ADULT - SUBJECTIVE AND OBJECTIVE BOX
Per Admission H&P:    "Pt is 61 year old female  hx of seizure( not on any meds) HCV ?cirrhosis, newly diagnosed COPD not on inhalers, chronic migraines and anxiety on Xanax admitted with new onset witnessed seizure.  hx of seizure, not on any meds. Seizure witnessed with generalized tonic clonic movements 3-4 min and had another episode in ed. as per  she had 3-4 seizure in past but walked out AMA during last admission. Hx somewhat limited due to patient's mental status- medication effect vs postictal state. Alcohol consumption unclear, states she drinks every day 4-6 beers with last beer last night 1/26 but difficult to obtain further information about chronic consumption.  Of note ran out of Xanax 2 days ago. As per  collateral information obtained from : patient has been drinking heavily for many years at least a six pack of beer a day or every other day. As per  pt has been space out on and off for last 6-8 months and has been having severe panic attacks. Pt denies any drug abuse or any other acute finding. Pt was out of her xanax for 1 week."     On my interviewing Pt she reports that she has been taking Xanax on prescription for 3 decades.  She was in doctor's office to get a new Rx for it, having run out of medication and refills about a week before, when she had convulsive activity in the office.  She states that her current EtOH consumption is three 16oz cans of beer 3 days a week (however that statement is belied by the above).            Pt has been seen once previously in neurologic consultation within [facility], on 7/6/20 by me, when she had  a seizure-like episode.  From that consult response I quote the following:    "DISCUSSION    It is clear that she quickly recovered from each episode of seizure or seizure-like activity, apparently without, or with only a brief, period of post-ictal depression of consciousness.   Likely had withdrawal (from EtOH and/or benzodiazepine) seizures.  Alternatively had incipient delirium tremens, aborted by hospital treatment.  In addition, given her hepatic insufficiency and poor nutritional status (probably inadequate glycogen stores) she may have been hypoglycemic.  In any event, the seizures (if that is what they were) were provoked.  This clinical picture is not compatible with a primary seizure disorder."       She is on CIWA protocol with current standing dose of 2mg lorazepam IV Q 12 hrs (decreased from 2mg Q 6 hrs).      Tor prot/alb averaging ~ 5/7/2.4    Non-con head CT: unremarkable    EEG:  "EEG Summary / Classification:  Abnormal EEG in the awake, drowsy and asleep states.  - Mild generalized slowing.  - Diffuse beta activity.    EEG Impression / Clinical Correlate:  Abnormal EEG study.  Mild nonspecific diffuse or multifocal cerebral dysfunction.   Excess diffuse beta activity may be seen with medication use such as benzodiazepines or barbiturates.  No epileptiform pattern or seizure seen."    EXAMINATION    Awake, alert, semirecumbent in bed.  Thin build; cachectic appearance.  Calm; no overt manifestations of withdrawal.  Grossly intact speech comprehension, expression, prosody, articulation/  PERRL; EOMI; fields grossly intact.  Very weak all over (as noted in July 2020).  R knee pain precludes getting her out of bed at this time.  She had prominent ascites when seen in July; not evident now on visual inspection (Pt reports it goes up and down, and that she has not had paracentesis).      Reflex                           Right    Left   Comment    Biceps                             2-       2-  Triceps                           1+      1+  Patellar                 too tender    2-  Gastroc                           0        0  Plantar                         flexor  flexor    Grossly intact P/LT sensation

## 2021-01-27 NOTE — EEG REPORT - NS EEG TEXT BOX
[facility]  Comprehensive Epilepsy Center  Report of Routine EEG with Video    [facility]: [address], [place] [address], Phone [phone]  [facility]: [address] [place] [address], Phone [phone]  [place] [facility]: [address], [address], [place] [address] Phone [phone]    [facility]: [address] [place] [address], Phone [phone]  [place] [facility]: [address] [place] [address], Phone [phone]    Patient Name: MIKE FUENTES    Age: 61 year  : 1960  Patient ID: -, MRN #: -, Location: --  Referring Physician: -  EEG #:     Study Date: 2021     Technical Information:					  On Instrument: -  Placement and Labeling of Electrodes:  The EEG was performed utilizing 20 channels referential EEG connections (coronal over temporal over parasagittal montage) using all standard 10-20 electrode placements with EKG.  Recording was at a sampling rate of 256 samples per second per channel.  Time synchronized digital video recording was done simultaneously with EEG recording.  A low light infrared camera was used for low light recording.  Pedro Pablo and seizure detection algorithms were utilized.    History:   Routine study..perfromed bedside  Patient awake and restless  HV not performed and photic performed  60 Y/O female presents with : convulsions  Concern for seizures  Encephalopathy  ETOH abuse  Alcohol withdrawal  Pneumonia    Pertinent Medication  No Data.    Study Interpretation:  Findings: The background was continuous, spontaneously variable and reactive. During wakefulness, the posterior dominant rhythm consisted of symmetric, 8 Hz activity, with amplitude to 30 uV, that attenuated to eye opening.     Background Slowing:  Intermittent irregular theta activity.    Focal Slowing:   None were present.    Sleep Background:  Drowsiness was characterized by fragmentation, attenuation, and slowing of the background activity.    Stage II sleep transients were not recorded.    Other Non-Epileptiform Findings:  Diffuse excess beta activity.    Interictal Epileptiform Activity:   None were present.    Events:  Clinical events: None recorded.  Seizures: None recorded.    Activation Procedures:   Hyperventilation was not performed.    Photic stimulation was performed and did not elicit any abnormalities.      Artifacts:  Intermittent myogenic and movement artifacts were noted.    ECG:  The heart rate on single channel ECG was predominantly between 70-80 BPM.    EEG Summary / Classification:  Abnormal EEG in the awake, drowsy and asleep states.  - Mild generalized slowing.  - Diffuse beta activity.    EEG Impression / Clinical Correlate:  Abnormal EEG study.  Mild nonspecific diffuse or multifocal cerebral dysfunction.   Excess diffuse beta activity may be seen with medication use such as benzodiazepines or barbiturates.  No epileptiform pattern or seizure seen.    Collins Fermin MD  Attending Physician, [facility] [place]

## 2021-01-27 NOTE — PROGRESS NOTE ADULT - SUBJECTIVE AND OBJECTIVE BOX
HPI:  Pt is 61 year old female  hx of seizure( not on any meds) HCV ?cirrhosis, newly diagnosed COPD not on inhalers, chronic migraines and anxiety on Xanax admitted with new onset witnessed seizure.  hx of seizure, not on any meds. Seizure witnessed with generalized tonic clonic movements 3-4 min and had another episode in ed. as per  she had 3-4 seizure in past but walked out AMA during last admission. Hx somewhat limited due to patient's mental status- medication effect vs postictal state. Alcohol consumption unclear, states she drinks every day 4-6 beers with last beer last night  but difficult to obtain further information about chronic consumption.  Of note ran out of Xanax 2 days ago. As per  collateral information obtained from : patient has been drinking heavily for many years at least a six pack of beer a day or every other day. As per  pt has been space out on and off for last 6-8 months and has been having severe panic attacks. Pt denies any drug abuse or any other acute finding. Pt was out of her xanax for 1 week.  (2021 16:15)      Patient is a 61y old  Female who presents with a chief complaint of seizure (2021 14:19)      INTERVAL HPI/OVERNIGHT EVENTS:  T(C): 36.4 (21 @ 14:43), Max: 38.1 (21 @ 20:19)  HR: 85 (21 @ 14:43) (80 - 100)  BP: 125/60 (21 @ 14:43) (112/76 - 143/68)  RR: 18 (21 @ 14:43) (18 - 19)  SpO2: 97% (21 @ 14:43) (95% - 98%)  Wt(kg): --  I&O's Summary    2021 07:01  -  2021 07:00  --------------------------------------------------------  IN: 0 mL / OUT: 800 mL / NET: -800 mL        REVIEW OF SYSTEMS: denies fever, chills, SOB, palpitations, chest pain, abdominal pain, nausea, vomitting, diarrhea, constipation, dizziness    MEDICATIONS  (STANDING):  cefepime   IVPB 2000 milliGRAM(s) IV Intermittent every 8 hours  cyanocobalamin 1000 MICROGram(s) Oral daily  enoxaparin Injectable 40 milliGRAM(s) SubCutaneous daily  folic acid 1 milliGRAM(s) Oral daily  lidocaine   Patch 2 Patch Transdermal daily  LORazepam   Injectable 2 milliGRAM(s) IV Push every 12 hours  multivitamin 1 Tablet(s) Oral daily  nicotine -  14 mG/24Hr(s) Patch 1 patch Transdermal daily  ondansetron Injectable 4 milliGRAM(s) IV Push two times a day  pantoprazole    Tablet 40 milliGRAM(s) Oral before breakfast  thiamine IVPB 500 milliGRAM(s) IV Intermittent three times a day    MEDICATIONS  (PRN):  acetaminophen  Suppository .. 650 milliGRAM(s) Rectal every 6 hours PRN Temp greater or equal to 38C (100.4F), Mild Pain (1 - 3)  ALPRAZolam 1 milliGRAM(s) Oral daily PRN anxiety  LORazepam   Injectable 2 milliGRAM(s) IV Push every 2 hours PRN CIWA-Ar score increase by 2 points and a total score of 7 or less      PHYSICAL EXAM:  GENERAL: NAD, well-groomed, well-developed  HEAD:  Atraumatic, Normocephalic  EYES: EOMI, PERRLA, conjunctiva and sclera clear  ENMT: No tonsillar erythema, exudates, or enlargement; Moist mucous membranes, Good dentition, No lesions  NECK: Supple, No JVD, Normal thyroid  NERVOUS SYSTEM:  Alert & Oriented X3, Good concentration; Motor Strength 5/5 B/L upper and lower extremities; DTRs 2+ intact and symmetric  CHEST/LUNG: Clear to percussion bilaterally; No rales, rhonchi, wheezing, or rubs  HEART: Regular rate and rhythm; No murmurs, rubs, or gallops  ABDOMEN: Soft, Nontender, Nondistended; Bowel sounds present  EXTREMITIES:  2+ Peripheral Pulses, No clubbing, cyanosis, or edema  LYMPH: No lymphadenopathy noted  SKIN: No rashes or lesions  LABS:                        9.5    2.64  )-----------( 62       ( 2021 10:55 )             28.5     01-    140  |  111<H>  |  7   ----------------------------<  89  4.0   |  24  |  0.80    Ca    7.4<L>      2021 06:23  Phos  4.0       Mg     2.1         TPro  5.5<L>  /  Alb  2.5<L>  /  TBili  1.7<H>  /  DBili  x   /  AST  39  /  ALT  15  /  AlkPhos  91      PT/INR - ( 2021 13:13 )   PT: 14.5 sec;   INR: 1.23 ratio         PTT - ( 2021 13:13 )  PTT:34.1 sec  Urinalysis Basic - ( 2021 22:58 )    Color: Yellow / Appearance: Clear / S.015 / pH: x  Gluc: x / Ketone: Negative  / Bili: Negative / Urobili: Negative   Blood: x / Protein: Negative / Nitrite: Negative   Leuk Esterase: Negative / RBC: x / WBC x   Sq Epi: x / Non Sq Epi: x / Bacteria: x      CAPILLARY BLOOD GLUCOSE            Urinalysis Basic - ( 2021 22:58 )    Color: Yellow / Appearance: Clear / S.015 / pH: x  Gluc: x / Ketone: Negative  / Bili: Negative / Urobili: Negative   Blood: x / Protein: Negative / Nitrite: Negative   Leuk Esterase: Negative / RBC: x / WBC x   Sq Epi: x / Non Sq Epi: x / Bacteria: x

## 2021-01-27 NOTE — SBIRT NOTE ADULT - NSSBIRTALCPOSREINDET_GEN_A_CORE
Positive SBIRT Consult  Pt admitted to drinking alcohol occasionally and claimed not to have any problems with her alcohol intake. However, positive reinforcement provided via education / counseling.         Pt admitted to drinking alcohol occasionally and claimed not to have any problems with her alcohol intake. However, positive reinforcement provided via education / counseling.

## 2021-01-27 NOTE — CONSULT NOTE ADULT - ASSESSMENT
Xanax +/- EtOH withdrawal seizure.      Chronic EtOH use disorder.    Chronic anxiety; benzodiazepine dependence.    Severe generalized asthenia.      Protein calorie malnutrition.     Hypoalbuminemia.      RECOMMENDATIONS    Management of withdrawal.    No anticonvulsant Rx.     PT for strengthening exercise regimen.

## 2021-01-28 DIAGNOSIS — F10.230 ALCOHOL DEPENDENCE WITH WITHDRAWAL, UNCOMPLICATED: ICD-10-CM

## 2021-01-28 DIAGNOSIS — F13.20 SEDATIVE, HYPNOTIC OR ANXIOLYTIC DEPENDENCE, UNCOMPLICATED: ICD-10-CM

## 2021-01-28 DIAGNOSIS — D61.818 OTHER PANCYTOPENIA: ICD-10-CM

## 2021-01-28 DIAGNOSIS — F19.10 OTHER PSYCHOACTIVE SUBSTANCE ABUSE, UNCOMPLICATED: ICD-10-CM

## 2021-01-28 DIAGNOSIS — E44.0 MODERATE PROTEIN-CALORIE MALNUTRITION: ICD-10-CM

## 2021-01-28 DIAGNOSIS — Z04.6 ENCOUNTER FOR GENERAL PSYCHIATRIC EXAMINATION, REQUESTED BY AUTHORITY: ICD-10-CM

## 2021-01-28 LAB
ALBUMIN SERPL ELPH-MCNC: 2.4 G/DL — LOW (ref 3.5–5)
ALP SERPL-CCNC: 94 U/L — SIGNIFICANT CHANGE UP (ref 40–120)
ALT FLD-CCNC: 15 U/L DA — SIGNIFICANT CHANGE UP (ref 10–60)
ANION GAP SERPL CALC-SCNC: 6 MMOL/L — SIGNIFICANT CHANGE UP (ref 5–17)
AST SERPL-CCNC: 31 U/L — SIGNIFICANT CHANGE UP (ref 10–40)
BILIRUB SERPL-MCNC: 1.6 MG/DL — HIGH (ref 0.2–1.2)
BUN SERPL-MCNC: 6 MG/DL — LOW (ref 7–18)
CALCIUM SERPL-MCNC: 8 MG/DL — LOW (ref 8.4–10.5)
CHLORIDE SERPL-SCNC: 105 MMOL/L — SIGNIFICANT CHANGE UP (ref 96–108)
CO2 SERPL-SCNC: 26 MMOL/L — SIGNIFICANT CHANGE UP (ref 22–31)
CREAT SERPL-MCNC: 0.82 MG/DL — SIGNIFICANT CHANGE UP (ref 0.5–1.3)
FERRITIN SERPL-MCNC: 109 NG/ML — SIGNIFICANT CHANGE UP (ref 15–150)
FOLATE SERPL-MCNC: 13.8 NG/ML — SIGNIFICANT CHANGE UP
GLUCOSE BLDC GLUCOMTR-MCNC: 101 MG/DL — HIGH (ref 70–99)
GLUCOSE SERPL-MCNC: 110 MG/DL — HIGH (ref 70–99)
HCT VFR BLD CALC: 28.7 % — LOW (ref 34.5–45)
HGB BLD-MCNC: 9.5 G/DL — LOW (ref 11.5–15.5)
IRON SATN MFR SERPL: 20 % — SIGNIFICANT CHANGE UP (ref 15–50)
IRON SATN MFR SERPL: 56 UG/DL — SIGNIFICANT CHANGE UP (ref 40–150)
MAGNESIUM SERPL-MCNC: 1.9 MG/DL — SIGNIFICANT CHANGE UP (ref 1.6–2.6)
MCHC RBC-ENTMCNC: 32.4 PG — SIGNIFICANT CHANGE UP (ref 27–34)
MCHC RBC-ENTMCNC: 33.1 GM/DL — SIGNIFICANT CHANGE UP (ref 32–36)
MCV RBC AUTO: 98 FL — SIGNIFICANT CHANGE UP (ref 80–100)
NRBC # BLD: 0 /100 WBCS — SIGNIFICANT CHANGE UP (ref 0–0)
PHOSPHATE SERPL-MCNC: 3.2 MG/DL — SIGNIFICANT CHANGE UP (ref 2.5–4.5)
PLATELET # BLD AUTO: 55 K/UL — LOW (ref 150–400)
POTASSIUM SERPL-MCNC: 4 MMOL/L — SIGNIFICANT CHANGE UP (ref 3.5–5.3)
POTASSIUM SERPL-SCNC: 4 MMOL/L — SIGNIFICANT CHANGE UP (ref 3.5–5.3)
PROT SERPL-MCNC: 5.8 G/DL — LOW (ref 6–8.3)
RBC # BLD: 2.93 M/UL — LOW (ref 3.8–5.2)
RBC # FLD: 13.8 % — SIGNIFICANT CHANGE UP (ref 10.3–14.5)
SODIUM SERPL-SCNC: 137 MMOL/L — SIGNIFICANT CHANGE UP (ref 135–145)
TIBC SERPL-MCNC: 284 UG/DL — SIGNIFICANT CHANGE UP (ref 250–450)
UIBC SERPL-MCNC: 228 UG/DL — SIGNIFICANT CHANGE UP (ref 110–370)
VIT B12 SERPL-MCNC: 772 PG/ML — SIGNIFICANT CHANGE UP (ref 232–1245)
WBC # BLD: 2.26 K/UL — LOW (ref 3.8–10.5)
WBC # FLD AUTO: 2.26 K/UL — LOW (ref 3.8–10.5)

## 2021-01-28 PROCEDURE — 73562 X-RAY EXAM OF KNEE 3: CPT | Mod: 26,RT

## 2021-01-28 PROCEDURE — 90792 PSYCH DIAG EVAL W/MED SRVCS: CPT

## 2021-01-28 RX ORDER — METRONIDAZOLE 500 MG
500 TABLET ORAL EVERY 8 HOURS
Refills: 0 | Status: DISCONTINUED | OUTPATIENT
Start: 2021-01-28 | End: 2021-01-29

## 2021-01-28 RX ORDER — CEFEPIME 1 G/1
1000 INJECTION, POWDER, FOR SOLUTION INTRAMUSCULAR; INTRAVENOUS EVERY 12 HOURS
Refills: 0 | Status: DISCONTINUED | OUTPATIENT
Start: 2021-01-28 | End: 2021-01-29

## 2021-01-28 RX ADMIN — Medication 105 MILLIGRAM(S): at 13:25

## 2021-01-28 RX ADMIN — Medication 1 PATCH: at 07:48

## 2021-01-28 RX ADMIN — Medication 105 MILLIGRAM(S): at 21:03

## 2021-01-28 RX ADMIN — PREGABALIN 1000 MICROGRAM(S): 225 CAPSULE ORAL at 11:22

## 2021-01-28 RX ADMIN — Medication 1 PATCH: at 11:23

## 2021-01-28 RX ADMIN — Medication 1 MILLIGRAM(S): at 11:22

## 2021-01-28 RX ADMIN — Medication 105 MILLIGRAM(S): at 06:15

## 2021-01-28 RX ADMIN — PANTOPRAZOLE SODIUM 40 MILLIGRAM(S): 20 TABLET, DELAYED RELEASE ORAL at 05:36

## 2021-01-28 RX ADMIN — Medication 2 MILLIGRAM(S): at 01:51

## 2021-01-28 RX ADMIN — Medication 25 MILLIGRAM(S): at 23:08

## 2021-01-28 RX ADMIN — Medication 1 TABLET(S): at 11:22

## 2021-01-28 RX ADMIN — CEFEPIME 100 MILLIGRAM(S): 1 INJECTION, POWDER, FOR SOLUTION INTRAMUSCULAR; INTRAVENOUS at 13:25

## 2021-01-28 RX ADMIN — Medication 100 MILLIGRAM(S): at 13:26

## 2021-01-28 RX ADMIN — Medication 100 MILLIGRAM(S): at 21:03

## 2021-01-28 RX ADMIN — Medication 25 MILLIGRAM(S): at 17:16

## 2021-01-28 RX ADMIN — CEFEPIME 100 MILLIGRAM(S): 1 INJECTION, POWDER, FOR SOLUTION INTRAMUSCULAR; INTRAVENOUS at 05:36

## 2021-01-28 RX ADMIN — Medication 1 PATCH: at 21:04

## 2021-01-28 RX ADMIN — Medication 2 MILLIGRAM(S): at 05:51

## 2021-01-28 RX ADMIN — Medication 25 MILLIGRAM(S): at 11:22

## 2021-01-28 NOTE — DIETITIAN INITIAL EVALUATION ADULT. - PROBLEM SELECTOR PLAN 2
Toxic metabolic encephalopathy- due to medication effect vs post ictal state, possible alcohol withdrawal-   EtOH level negative,  f/u  CT head

## 2021-01-28 NOTE — DIETITIAN INITIAL EVALUATION ADULT. - OTHER INFO
Patient from home lives with family. Visited pt. alert but agitated, Patient from home lives with family. Visited pt. alert but agitated, reports "appetite fine"?, denies nausea/vomiting or diarrhea PTA, stated "disliked hospital meals", consuming ~35-40% of meals, tried to obtained food preferences & offered nutrition supplement but pt. declined, stating "she wants to leave today"?, refusing further discussion with RD presently, d/w RN, Speech/Swallow evaluation pending, encourage po intake with meals set up,  following, skin intact, no edema.

## 2021-01-28 NOTE — DIETITIAN INITIAL EVALUATION ADULT. - PROBLEM SELECTOR PLAN 4
pT HAS BILATERAL KNEE pain   pt is for knee replacment surgery soon  will give lidocain patches fornow

## 2021-01-28 NOTE — CONSULT NOTE ADULT - SUBJECTIVE AND OBJECTIVE BOX
HPI:  Pt is 61 year old female  hx of seizure( not on any meds) HCV ?cirrhosis, newly diagnosed COPD not on inhalers, chronic migraines and anxiety on Xanax admitted with new onset witnessed seizure.  hx of seizure, not on any meds. Seizure witnessed with generalized tonic clonic movements 3-4 min and had another episode in ed. as per  she had 3-4 seizure in past but walked out AMA during last admission. Hx somewhat limited due to patient's mental status- medication effect vs postictal state. Alcohol consumption unclear, states she drinks every day 4-6 beers with last beer last night  but difficult to obtain further information about chronic consumption.  Of note ran out of Xanax 2 days ago. As per  collateral information obtained from : patient has been drinking heavily for many years at least a six pack of beer a day or every other day. As per  pt has been space out on and off for last 6-8 months and has been having severe panic attacks. Pt denies any drug abuse or any other acute finding. Pt was out of her xanax for 1 week.  (2021 16:15)      PAST MEDICAL & SURGICAL HISTORY:  Anxiety    ETOH abuse    Right knee pain    RA (rheumatoid arthritis)    Hepatitis C    S/P         penicillin (Rash)      Meds:  acetaminophen  Suppository .. 650 milliGRAM(s) Rectal every 6 hours PRN  cefepime   IVPB 2000 milliGRAM(s) IV Intermittent every 8 hours  chlordiazePOXIDE 25 milliGRAM(s) Oral every 6 hours  cyanocobalamin 1000 MICROGram(s) Oral daily  enoxaparin Injectable 40 milliGRAM(s) SubCutaneous daily  folic acid 1 milliGRAM(s) Oral daily  lidocaine   Patch 2 Patch Transdermal daily  metroNIDAZOLE  IVPB 500 milliGRAM(s) IV Intermittent every 8 hours  multivitamin 1 Tablet(s) Oral daily  nicotine -  14 mG/24Hr(s) Patch 1 patch Transdermal daily  ondansetron Injectable 4 milliGRAM(s) IV Push two times a day  pantoprazole    Tablet 40 milliGRAM(s) Oral before breakfast  thiamine IVPB 500 milliGRAM(s) IV Intermittent three times a day      SOCIAL HISTORY:  Smoker:  YES / NO        PACK YEARS:                         WHEN QUIT?  ETOH use:  YES / NO               FREQUENCY / QUANTITY:  Ilicit Drug use:  YES / NO  Occupation:  Assisted device use (Cane / Walker):  Live with:    FAMILY HISTORY:      VITALS:  Vital Signs Last 24 Hrs  T(C): 37.2 (2021 05:15), Max: 37.2 (2021 05:15)  T(F): 99 (2021 05:15), Max: 99 (2021 05:15)  HR: 84 (2021 05:15) (84 - 96)  BP: 124/58 (2021 05:15) (120/57 - 134/72)  BP(mean): --  RR: 16 (2021 05:15) (16 - 17)  SpO2: 98% (2021 05:15) (98% - 100%)    LABS/DIAGNOSTIC TESTS:                          9.5    2.26  )-----------( 55       ( 2021 07:26 )             28.7     WBC Count: 2.26 K/uL ( @ 07:26)  WBC Count: 2.64 K/uL ( @ 10:55)  WBC Count: 2.93 K/uL ( @ 06:23)  WBC Count: 2.79 K/uL ( @ 13:13)          137  |  105  |  6<L>  ----------------------------<  110<H>  4.0   |  26  |  0.82    Ca    8.0<L>      2021 07:26  Phos  3.2       Mg     1.9         TPro  5.8<L>  /  Alb  2.4<L>  /  TBili  1.6<H>  /  DBili  x   /  AST  31  /  ALT  15  /  AlkPhos  94        Urinalysis Basic - ( 2021 22:58 )    Color: Yellow / Appearance: Clear / S.015 / pH: x  Gluc: x / Ketone: Negative  / Bili: Negative / Urobili: Negative   Blood: x / Protein: Negative / Nitrite: Negative   Leuk Esterase: Negative / RBC: x / WBC x   Sq Epi: x / Non Sq Epi: x / Bacteria: x        LIVER FUNCTIONS - ( 2021 07:26 )  Alb: 2.4 g/dL / Pro: 5.8 g/dL / ALK PHOS: 94 U/L / ALT: 15 U/L DA / AST: 31 U/L / GGT: x                 LACTATE:    ABG -     CULTURES:   .Urine Clean Catch (Midstream)   @ 03:09   No growth  --  --      .Blood Blood   @ 22:03   No growth to date.  --  --          RADIOLOGY:< from: Xray Chest 1 View-PORTABLE IMMEDIATE (Xray Chest 1 View-PORTABLE IMMEDIATE .) (21 @ 13:53) >  EXAM:  XR CHEST PORTABLE IMMED 1V                            PROCEDURE DATE:  2021          INTERPRETATION:  CLINICAL INDICATION: 61 years  Female with seizure.    COMPARISON: 2020    AP view of the chest demonstrates mild pulmonary vascular prominence. There is a patchy right lower lobe infiltrate. There is no pleural effusion. There is no pneumothorax.    The heart is enlarged. The mediastinum and didier cannot be assessed due to rotation.    There is diffuse osteopenia.    IMPRESSION:    Mild pulmonary vascular congestion.    Patchy right lower lobe infiltrate.            MIRIAM HELM MD; Attending Radiologist  This document has been electronically signed. 2021  2:06PM    < end of copied text >        ROS  [  ] UNABLE TO ELICIT               HPI:  Pt is 61 year old female  hx of seizure( not on any meds) HCV ?cirrhosis, newly diagnosed COPD not on inhalers, chronic migraines and anxiety on Xanax admitted with new onset witnessed seizure.  hx of seizure, not on any meds. Seizure witnessed with generalized tonic clonic movements 3-4 min and had another episode in ed. as per  she had 3-4 seizure in past but walked out AMA during last admission. Hx somewhat limited due to patient's mental status- medication effect vs postictal state. Alcohol consumption unclear, states she drinks every day 4-6 beers with last beer last night  but difficult to obtain further information about chronic consumption.  Of note ran out of Xanax 2 days ago. As per  collateral information obtained from : patient has been drinking heavily for many years at least a six pack of beer a day or every other day. As per  pt has been space out on and off for last 6-8 months and has been having severe panic attacks. Pt denies any drug abuse or any other acute finding. Pt was out of her xanax for 1 week.  (2021 16:15)    History as above , pt who was admitted with a seizure secondary to alcohol withdrawal and found to have a RLL pneumonia on CXR , she has a slight cough but no sob or chest pain, she no fevers or chills, no diarrhea or other complaints at this time.       PAST MEDICAL & SURGICAL HISTORY:  Anxiety    ETOH abuse    Right knee pain    RA (rheumatoid arthritis)    Hepatitis C    S/P         penicillin (Rash)      Meds:  acetaminophen  Suppository .. 650 milliGRAM(s) Rectal every 6 hours PRN  cefepime   IVPB 2000 milliGRAM(s) IV Intermittent every 8 hours  chlordiazePOXIDE 25 milliGRAM(s) Oral every 6 hours  cyanocobalamin 1000 MICROGram(s) Oral daily  enoxaparin Injectable 40 milliGRAM(s) SubCutaneous daily  folic acid 1 milliGRAM(s) Oral daily  lidocaine   Patch 2 Patch Transdermal daily  metroNIDAZOLE  IVPB 500 milliGRAM(s) IV Intermittent every 8 hours  multivitamin 1 Tablet(s) Oral daily  nicotine -  14 mG/24Hr(s) Patch 1 patch Transdermal daily  ondansetron Injectable 4 milliGRAM(s) IV Push two times a day  pantoprazole    Tablet 40 milliGRAM(s) Oral before breakfast  thiamine IVPB 500 milliGRAM(s) IV Intermittent three times a day      SOCIAL HISTORY:  Smoker:  YES   ETOH use:  YES  Ilicit Drug use:  YES , cocaine      FAMILY HISTORY: not contributory      VITALS:  Vital Signs Last 24 Hrs  T(C): 37.2 (2021 05:15), Max: 37.2 (2021 05:15)  T(F): 99 (2021 05:15), Max: 99 (2021 05:15)  HR: 84 (2021 05:15) (84 - 96)  BP: 124/58 (2021 05:15) (120/57 - 134/72)  BP(mean): --  RR: 16 (2021 05:15) (16 - 17)  SpO2: 98% (2021 05:15) (98% - 100%)    LABS/DIAGNOSTIC TESTS:                          9.5    2.26  )-----------( 55       ( 2021 07:26 )             28.7     WBC Count: 2.26 K/uL ( @ 07:26)  WBC Count: 2.64 K/uL ( @ 10:55)  WBC Count: 2.93 K/uL ( @ 06:23)  WBC Count: 2.79 K/uL ( @ 13:13)          137  |  105  |  6<L>  ----------------------------<  110<H>  4.0   |  26  |  0.82    Ca    8.0<L>      2021 07:26  Phos  3.2       Mg     1.9         TPro  5.8<L>  /  Alb  2.4<L>  /  TBili  1.6<H>  /  DBili  x   /  AST  31  /  ALT  15  /  AlkPhos  94        Urinalysis Basic - ( 2021 22:58 )    Color: Yellow / Appearance: Clear / S.015 / pH: x  Gluc: x / Ketone: Negative  / Bili: Negative / Urobili: Negative   Blood: x / Protein: Negative / Nitrite: Negative   Leuk Esterase: Negative / RBC: x / WBC x   Sq Epi: x / Non Sq Epi: x / Bacteria: x        LIVER FUNCTIONS - ( 2021 07:26 )  Alb: 2.4 g/dL / Pro: 5.8 g/dL / ALK PHOS: 94 U/L / ALT: 15 U/L DA / AST: 31 U/L / GGT: x                 LACTATE:    ABG -     CULTURES:   .Urine Clean Catch (Midstream)   @ 03:09   No growth  --  --      .Blood Blood   @ 22:03   No growth to date.  --  --          RADIOLOGY:< from: Xray Chest 1 View-PORTABLE IMMEDIATE (Xray Chest 1 View-PORTABLE IMMEDIATE .) (21 @ 13:53) >  EXAM:  XR CHEST PORTABLE IMMED 1V                            PROCEDURE DATE:  2021          INTERPRETATION:  CLINICAL INDICATION: 61 years  Female with seizure.    COMPARISON: 2020    AP view of the chest demonstrates mild pulmonary vascular prominence. There is a patchy right lower lobe infiltrate. There is no pleural effusion. There is no pneumothorax.    The heart is enlarged. The mediastinum and didier cannot be assessed due to rotation.    There is diffuse osteopenia.    IMPRESSION:    Mild pulmonary vascular congestion.    Patchy right lower lobe infiltrate.            MIRIAM HELM MD; Attending Radiologist  This document has been electronically signed. 2021  2:06PM    < end of copied text >        ROS  [  ] UNABLE TO ELICIT

## 2021-01-28 NOTE — DIETITIAN INITIAL EVALUATION ADULT. - PROBLEM SELECTOR PLAN 3
pT has been drinking more as per    last drink last night 1/25   3-4 bears   will start ciwa protocol  ativan standing q6 2mg  ativan prn 2mg   thiamin iv  monitor ciwa daily

## 2021-01-28 NOTE — BEHAVIORAL HEALTH ASSESSMENT NOTE - NSBHCHARTREVIEWVS_PSY_A_CORE FT
Vital Signs Last 24 Hrs  T(C): 37.2 (28 Jan 2021 05:15), Max: 37.2 (28 Jan 2021 05:15)  T(F): 99 (28 Jan 2021 05:15), Max: 99 (28 Jan 2021 05:15)  HR: 84 (28 Jan 2021 05:15) (84 - 96)  BP: 124/58 (28 Jan 2021 05:15) (120/57 - 134/72)  BP(mean): --  RR: 16 (28 Jan 2021 05:15) (16 - 17)  SpO2: 98% (28 Jan 2021 05:15) (98% - 100%)

## 2021-01-28 NOTE — PROGRESS NOTE ADULT - PROBLEM SELECTOR PLAN 5
-c/w lidocain patch  -Knee x-ray possible right non displaced fracture, f/u CT b/l knee as recommended by radiology

## 2021-01-28 NOTE — PROVIDER CONTACT NOTE (CRITICAL VALUE NOTIFICATION) - SITUATION
TYRESE Ha made aware. No further intervention taken at this time. Will endorse to AM nurse of situation.

## 2021-01-28 NOTE — DIETITIAN INITIAL EVALUATION ADULT. - PERTINENT LABORATORY DATA
01-28 Na137 mmol/L Glu 110 mg/dL<H> K+ 4.0 mmol/L Cr  0.82 mg/dL BUN 6 mg/dL<L> 01-28 Phos 3.2 mg/dL 01-28 Alb 2.4 g/dL<L> 01-27 Chol 68 mg/dL LDL --    HDL 38 mg/dL<L> Trig 55 mg/dL

## 2021-01-28 NOTE — BEHAVIORAL HEALTH ASSESSMENT NOTE - DESCRIPTION (FIRST USE, LAST USE, QUANTITY, FREQUENCY, DURATION)
Dependent on prescribed Xanax (1 mg BID, has been taking for 25 years) Reports drinking 2-3 beers daily; once a month, has >4

## 2021-01-28 NOTE — DIETITIAN INITIAL EVALUATION ADULT. - SIGNS/SYMPTOMS
Inadequate oral intake w/alcohol abuse PTA, inhouse intake 35-40% & Inadequate oral intake w/alcohol abuse PTA, inhouse intake 35-40% & SLP following

## 2021-01-28 NOTE — BEHAVIORAL HEALTH ASSESSMENT NOTE - NSBHCHARTREVIEWINVESTIGATE_PSY_A_CORE FT
< from: 12 Lead ECG (01.26.21 @ 12:48) >    QTC Calculation(Bazett) 473 ms    P Axis 63 degrees    R Axis 14 degrees    T Axis 42 degrees    Diagnosis Line Normal sinus rhythm  Normal ECG      < end of copied text >

## 2021-01-28 NOTE — DIETITIAN INITIAL EVALUATION ADULT. - PERTINENT MEDS FT
MEDICATIONS  (STANDING):  cefepime   IVPB 2000 milliGRAM(s) IV Intermittent every 8 hours  chlordiazePOXIDE 25 milliGRAM(s) Oral every 6 hours  cyanocobalamin 1000 MICROGram(s) Oral daily  enoxaparin Injectable 40 milliGRAM(s) SubCutaneous daily  folic acid 1 milliGRAM(s) Oral daily  lidocaine   Patch 2 Patch Transdermal daily  metroNIDAZOLE  IVPB 500 milliGRAM(s) IV Intermittent every 8 hours  multivitamin 1 Tablet(s) Oral daily  nicotine -  14 mG/24Hr(s) Patch 1 patch Transdermal daily  ondansetron Injectable 4 milliGRAM(s) IV Push two times a day  pantoprazole    Tablet 40 milliGRAM(s) Oral before breakfast  thiamine IVPB 500 milliGRAM(s) IV Intermittent three times a day    MEDICATIONS  (PRN):  acetaminophen  Suppository .. 650 milliGRAM(s) Rectal every 6 hours PRN Temp greater or equal to 38C (100.4F), Mild Pain (1 - 3)

## 2021-01-28 NOTE — CONSULT NOTE ADULT - ASSESSMENT
61 year old lady with copd, seizure, alcoholism on xanax.  she drinkes 4-6 beer a day.  The last drink was 3 days ago.  She has been on xanax but ran out of supply 1 week ago.  she was admitted for seizure.  But pancytopenia was found.    1. pancytopenia  it can be from alcoholism or liver cirrhosis and splenomegaly  will do US of abdomen  check b12, folate, ferritin  2. seizure  ?alcohol withdrawal or xanax withdrawal  should be on detox protocol  3. ?RLL infiltrate  ?aspiration  on antibiotics  4. seizure  ?withdrawal

## 2021-01-28 NOTE — CONSULT NOTE ADULT - ASSESSMENT
Pneumonia - likely aspiration  Leukopenia - likely from her Cirrhosis    Plan - Cont Maxipime but at 1 gm iv q12 hrs  Cont Flagyl 500mgs q8 hrs  if planning to DC in next 1-2 days can put on ceftin 500mgs po bid and flagyl 500mgs po TID both for 5 days more.       Pneumonia - likely aspiration  Leukopenia - likely from her Cirrhosis    Plan - Cont Maxipime but at 1 gm iv q12 hrs  Cont Flagyl 500mgs q8 hrs  if planning to DC in next 1-2 days can put on ceftin 500mgs po bid and flagyl 500mgs po TID both for 5 days more.  reconsult prn.

## 2021-01-28 NOTE — PROGRESS NOTE ADULT - PROBLEM SELECTOR PLAN 2
-afebrile, no leukocytosis  -chest x-ray- Patchy right lower lobe infiltrate.  -speech and swallow evaluation  -c/w cefepime  -started on Metronidazole  -ID Dr. Murray

## 2021-01-28 NOTE — BEHAVIORAL HEALTH ASSESSMENT NOTE - RISK ASSESSMENT
Risk factors: Older age, BZD dependence, chronic anxiety. Protective factors: Absent h/o SI/SA/SIB, stable domicile with supportive , attachment to independence, help seeking, therapeutic relationship with PCP who prescribes Xanax. Risk level appears low at the time of assessment. Low Acute Suicide Risk

## 2021-01-28 NOTE — PROGRESS NOTE ADULT - SUBJECTIVE AND OBJECTIVE BOX
NP Note discussed with  Primary Attending    Patient is a 61y old  Female who presents with a chief complaint of seizure (2021 13:36)      INTERVAL HPI/OVERNIGHT EVENTS: Patient seen and examined at bedside. Patient agitated, anxious, restless, requesting to leave AMA. Patient unsteady gait with right knee pain from fall previous to admission. Patient unable to recall when she fell. Patient seen by psychiatry, and cleared by Psychiatry for safe discharge. Patient to continue Librium 25mg PO q6h, until medically cleared for discharge.    MEDICATIONS  (STANDING):  cefepime   IVPB 2000 milliGRAM(s) IV Intermittent every 8 hours  chlordiazePOXIDE 25 milliGRAM(s) Oral every 6 hours  cyanocobalamin 1000 MICROGram(s) Oral daily  enoxaparin Injectable 40 milliGRAM(s) SubCutaneous daily  folic acid 1 milliGRAM(s) Oral daily  lidocaine   Patch 2 Patch Transdermal daily  metroNIDAZOLE  IVPB 500 milliGRAM(s) IV Intermittent every 8 hours  multivitamin 1 Tablet(s) Oral daily  nicotine -  14 mG/24Hr(s) Patch 1 patch Transdermal daily  ondansetron Injectable 4 milliGRAM(s) IV Push two times a day  pantoprazole    Tablet 40 milliGRAM(s) Oral before breakfast  thiamine IVPB 500 milliGRAM(s) IV Intermittent three times a day    MEDICATIONS  (PRN):  acetaminophen  Suppository .. 650 milliGRAM(s) Rectal every 6 hours PRN Temp greater or equal to 38C (100.4F), Mild Pain (1 - 3)      __________________________________________________  REVIEW OF SYSTEMS:    CONSTITUTIONAL: No fever,   EYES: no acute visual disturbances  NECK: No pain or stiffness  RESPIRATORY: No cough; No shortness of breath  CARDIOVASCULAR: No chest pain, no palpitations  GASTROINTESTINAL: No pain. No nausea or vomiting; No diarrhea   NEUROLOGICAL: No headache or numbness, no tremors  MUSCULOSKELETAL: right knee pain  GENITOURINARY: no dysuria, no frequency, no hesitancy  PSYCHIATRY: anxiety, no depression  ALL OTHER  ROS negative        Vital Signs Last 24 Hrs  T(C): 37.2 (2021 05:15), Max: 37.2 (2021 05:15)  T(F): 99 (2021 05:15), Max: 99 (2021 05:15)  HR: 84 (2021 05:15) (84 - 96)  BP: 124/58 (2021 05:15) (120/57 - 134/72)  BP(mean): --  RR: 16 (2021 05:15) (16 - 17)  SpO2: 98% (2021 05:15) (98% - 100%)    ________________________________________________  PHYSICAL EXAM:  GENERAL: NAD  HEENT: Normocephalic;  conjunctivae and sclerae clear; moist mucous membranes;   NECK : supple  CHEST/LUNG: Clear to auscultation bilaterally with good air entry   HEART: S1 S2  regular; no murmurs, gallops or rubs  ABDOMEN: Soft, Nontender, Nondistended; Bowel sounds present  EXTREMITIES: no cyanosis; no edema; no calf tenderness  SKIN: warm and dry; no rash  NERVOUS SYSTEM:  Awake and alert; Oriented  to place, person and time ; no new deficits    _________________________________________________  LABS:                        9.5    2.26  )-----------( 55       ( 2021 07:26 )             28.7         137  |  105  |  6<L>  ----------------------------<  110<H>  4.0   |  26  |  0.82    Ca    8.0<L>      2021 07:26  Phos  3.2       Mg     1.9         TPro  5.8<L>  /  Alb  2.4<L>  /  TBili  1.6<H>  /  DBili  x   /  AST  31  /  ALT  15  /  AlkPhos  94        Urinalysis Basic - ( 2021 22:58 )    Color: Yellow / Appearance: Clear / S.015 / pH: x  Gluc: x / Ketone: Negative  / Bili: Negative / Urobili: Negative   Blood: x / Protein: Negative / Nitrite: Negative   Leuk Esterase: Negative / RBC: x / WBC x   Sq Epi: x / Non Sq Epi: x / Bacteria: x      CAPILLARY BLOOD GLUCOSE      POCT Blood Glucose.: 101 mg/dL (2021 11:51)        RADIOLOGY & ADDITIONAL TESTS:    Imaging  Reviewed:  YES/NO    Consultant(s) Notes Reviewed:   YES/ No      Plan of care was discussed with patient and /or primary care giver; all questions and concerns were addressed  NP Note discussed with  Primary Attending    Patient is a 61y old  Female who presents with a chief complaint of seizure (2021 13:36)      INTERVAL HPI/OVERNIGHT EVENTS: Patient seen and examined at bedside. Patient agitated, anxious, restless, requesting to leave AMA. Patient unsteady gait with right knee pain from fall previous to admission. Patient unable to recall when she fell. Patient seen by psychiatry, and cleared by Psychiatry for safe discharge. Patient to continue Librium 25mg PO q6h, until medically cleared for discharge.    MEDICATIONS  (STANDING):  cefepime   IVPB 2000 milliGRAM(s) IV Intermittent every 8 hours  chlordiazePOXIDE 25 milliGRAM(s) Oral every 6 hours  cyanocobalamin 1000 MICROGram(s) Oral daily  enoxaparin Injectable 40 milliGRAM(s) SubCutaneous daily  folic acid 1 milliGRAM(s) Oral daily  lidocaine   Patch 2 Patch Transdermal daily  metroNIDAZOLE  IVPB 500 milliGRAM(s) IV Intermittent every 8 hours  multivitamin 1 Tablet(s) Oral daily  nicotine -  14 mG/24Hr(s) Patch 1 patch Transdermal daily  ondansetron Injectable 4 milliGRAM(s) IV Push two times a day  pantoprazole    Tablet 40 milliGRAM(s) Oral before breakfast  thiamine IVPB 500 milliGRAM(s) IV Intermittent three times a day    MEDICATIONS  (PRN):  acetaminophen  Suppository .. 650 milliGRAM(s) Rectal every 6 hours PRN Temp greater or equal to 38C (100.4F), Mild Pain (1 - 3)      __________________________________________________  REVIEW OF SYSTEMS:    CONSTITUTIONAL: No fever,   EYES: no acute visual disturbances  NECK: No pain or stiffness  RESPIRATORY: No cough; No shortness of breath  CARDIOVASCULAR: No chest pain, no palpitations  GASTROINTESTINAL: No pain. No nausea or vomiting; No diarrhea   NEUROLOGICAL: No headache or numbness, no tremors  MUSCULOSKELETAL: right knee pain  GENITOURINARY: no dysuria, no frequency, no hesitancy  PSYCHIATRY: anxiety, no depression  ALL OTHER  ROS negative        Vital Signs Last 24 Hrs  T(C): 37.2 (2021 05:15), Max: 37.2 (2021 05:15)  T(F): 99 (2021 05:15), Max: 99 (2021 05:15)  HR: 84 (2021 05:15) (84 - 96)  BP: 124/58 (2021 05:15) (120/57 - 134/72)  BP(mean): --  RR: 16 (2021 05:15) (16 - 17)  SpO2: 98% (2021 05:15) (98% - 100%)    ________________________________________________  PHYSICAL EXAM:  GENERAL: NAD  HEENT: Normocephalic;  conjunctivae and sclerae clear; moist mucous membranes;   NECK : supple  CHEST/LUNG: Clear to auscultation bilaterally with good air entry   HEART: S1 S2  regular; no murmurs, gallops or rubs  ABDOMEN: Soft, Nontender, Nondistended; Bowel sounds present  EXTREMITIES: no cyanosis; no edema; no calf tenderness  SKIN: warm and dry; no rash  NERVOUS SYSTEM:  Awake and alert; Oriented  to place, person and time ; no new deficits    _________________________________________________  LABS:                        9.5    2.26  )-----------( 55       ( 2021 07:26 )             28.7         137  |  105  |  6<L>  ----------------------------<  110<H>  4.0   |  26  |  0.82    Ca    8.0<L>      2021 07:26  Phos  3.2       Mg     1.9         TPro  5.8<L>  /  Alb  2.4<L>  /  TBili  1.6<H>  /  DBili  x   /  AST  31  /  ALT  15  /  AlkPhos  94        Urinalysis Basic - ( 2021 22:58 )    Color: Yellow / Appearance: Clear / S.015 / pH: x  Gluc: x / Ketone: Negative  / Bili: Negative / Urobili: Negative   Blood: x / Protein: Negative / Nitrite: Negative   Leuk Esterase: Negative / RBC: x / WBC x   Sq Epi: x / Non Sq Epi: x / Bacteria: x      CAPILLARY BLOOD GLUCOSE      POCT Blood Glucose.: 101 mg/dL (2021 11:51)        RADIOLOGY & ADDITIONAL TESTS:  < from: Xray Knee 3 Views, Right (21 @ 12:57) >  EXAM:  KNEE AP.LAT&OBL.RIGHT                            PROCEDURE DATE:  2021          INTERPRETATION:  CLINICAL STATEMENT: Pain. Fall.    TECHNIQUE: AP, lateral and oblique views of right knee.    COMPARISON: None.    FINDINGS:  No dislocation.  Chondrocalcinosis noted. Multi compartmental degenerative changes.    Questionable nondisplaced fracture lateral tibial plateau on the AP view.    IMPRESSION:  Questionable nondisplaced fracture lateral tibial plateau on the AP view. CT right knee recommended for better evaluation    < end of copied text >  < from: CT Head No Cont (21 @ 01:21) >    EXAM:  CT BRAIN                            PROCEDURE DATE:  2021          INTERPRETATION:  CLINICAL INFORMATION:  seizure    TECHNIQUE:  Axial CT images were acquired through the head.  Intravenous contrast: None  Two-dimensional reformats were generated.    COMPARISON STUDY: CT head 2020.    FINDINGS:    There is no CT evidence of acute intracranial hemorrhage, mass effect, midline shift, or acute, large transcortical infarct.  Few scattered periventricular and subcortical white matter hypodensities are nonspecific, although likely on the basis of mild chronic microangiopathy. The ventricles and sulci are normal in size and configuration. The basal cisterns are patent.    There are atherosclerotic calcifications of the carotid siphons.    The mastoid air cells and middle ear cavities are grossly clear. The visualized paranasal sinuses are well aerated.    The calvarium and skull base are grossly intact.    IMPRESSION:  No acute intracranial hemorrhage or mass effect.    < end of copied text >  < from: Xray Chest 1 View-PORTABLE IMMEDIATE (Xray Chest 1 View-PORTABLE IMMEDIATE .) (21 @ 13:53) >  EXAM:  XR CHEST PORTABLE IMMED 1V                            PROCEDURE DATE:  2021          INTERPRETATION:  CLINICAL INDICATION: 61 years  Female with seizure.    COMPARISON: 2020    AP view of the chest demonstrates mild pulmonary vascular prominence. There is a patchy right lower lobe infiltrate. There is no pleural effusion. There is no pneumothorax.    The heart is enlarged. The mediastinum and didier cannot be assessed due to rotation.    There is diffuse osteopenia.    IMPRESSION:    Mild pulmonary vascular congestion.    Patchy right lower lobe infiltrate.    < end of copied text >    Imaging  Reviewed:  YES    Consultant(s) Notes Reviewed:   YES      Plan of care was discussed with patient and /or primary care giver; all questions and concerns were addressed

## 2021-01-28 NOTE — BEHAVIORAL HEALTH ASSESSMENT NOTE - NSBHCHARTREVIEWIMAGING_PSY_A_CORE FT
< from: Xray Knee 3 Views, Right (01.28.21 @ 12:57) >    FINDINGS:  No dislocation.  Chondrocalcinosis noted. Multi compartmental degenerative changes.    Questionable nondisplaced fracture lateral tibial plateau on the AP view.      < end of copied text >    < from: CT Head No Cont (01.27.21 @ 01:21) >    FINDINGS:    There is no CT evidence of acute intracranial hemorrhage, mass effect, midline shift, or acute, large transcortical infarct.  Few scattered periventricular and subcortical white matter hypodensities are nonspecific, although likely on the basis of mild chronic microangiopathy. The ventricles and sulci are normal in size and configuration. The basal cisterns are patent.    There are atherosclerotic calcifications of the carotid siphons.    The mastoid air cells and middle ear cavities are grossly clear. The visualized paranasal sinuses are well aerated.    The calvarium and skull base are grossly intact.    < end of copied text >

## 2021-01-28 NOTE — BEHAVIORAL HEALTH ASSESSMENT NOTE - DIFFERENTIAL
Benzodiazepine dependence  Alcohol use disorder  GARCIA, by history  Encounter for general psychiatric examination, requested by authority

## 2021-01-28 NOTE — CONSULT NOTE ADULT - SUBJECTIVE AND OBJECTIVE BOX
Patient is a 61y old  Female who presents with a chief complaint of seizure (2021 19:20)      HPI:  Pt is 61 year old female  hx of seizure( not on any meds) HCV ?cirrhosis, newly diagnosed COPD not on inhalers, chronic migraines and anxiety on Xanax admitted with new onset witnessed seizure.  hx of seizure, not on any meds. Seizure witnessed with generalized tonic clonic movements 3-4 min and had another episode in ed. as per  she had 3-4 seizure in past but walked out AMA during last admission. Hx somewhat limited due to patient's mental status- medication effect vs postictal state. Alcohol consumption unclear, states she drinks every day 4-6 beers with last beer last night  but difficult to obtain further information about chronic consumption.  Of note ran out of Xanax 2 days ago. As per  collateral information obtained from : patient has been drinking heavily for many years at least a six pack of beer a day or every other day. As per  pt has been space out on and off for last 6-8 months and has been having severe panic attacks. Pt denies any drug abuse or any other acute finding. Pt was out of her xanax for 1 week.  (2021 16:15)she was found to have pancytopenia.       ROS:  Negative except for:    PAST MEDICAL & SURGICAL HISTORY:  Anxiety    ETOH abuse    Right knee pain    RA (rheumatoid arthritis)    Hepatitis C    S/P         SOCIAL HISTORY:    FAMILY HISTORY:      MEDICATIONS  (STANDING):  cefepime   IVPB 2000 milliGRAM(s) IV Intermittent every 8 hours  cyanocobalamin 1000 MICROGram(s) Oral daily  enoxaparin Injectable 40 milliGRAM(s) SubCutaneous daily  folic acid 1 milliGRAM(s) Oral daily  lidocaine   Patch 2 Patch Transdermal daily  LORazepam   Injectable 2 milliGRAM(s) IV Push every 12 hours  multivitamin 1 Tablet(s) Oral daily  nicotine -  14 mG/24Hr(s) Patch 1 patch Transdermal daily  ondansetron Injectable 4 milliGRAM(s) IV Push two times a day  pantoprazole    Tablet 40 milliGRAM(s) Oral before breakfast  thiamine IVPB 500 milliGRAM(s) IV Intermittent three times a day    MEDICATIONS  (PRN):  acetaminophen  Suppository .. 650 milliGRAM(s) Rectal every 6 hours PRN Temp greater or equal to 38C (100.4F), Mild Pain (1 - 3)  LORazepam   Injectable 2 milliGRAM(s) IV Push every 2 hours PRN CIWA-Ar score increase by 2 points and a total score of 7 or less      Allergies    penicillin (Rash)    Intolerances        Vital Signs Last 24 Hrs  T(C): 37.2 (2021 05:15), Max: 37.2 (2021 05:15)  T(F): 99 (2021 05:15), Max: 99 (2021 05:15)  HR: 84 (2021 05:15) (84 - 96)  BP: 124/58 (2021 05:15) (120/57 - 134/72)  BP(mean): --  RR: 16 (2021 05:15) (16 - 18)  SpO2: 98% (2021 05:15) (97% - 100%)    PHYSICAL EXAM  General: adult in NAD  HEENT: clear oropharynx, anicteric sclera, pink conjunctiva  Neck: supple  CV: normal S1/S2 with no murmur rubs or gallops  Lungs: positive air movement b/l ant lungs,clear to auscultation, no wheezes, no rales  Abdomen: soft non-tender non-distended, no hepatosplenomegaly  Ext: no clubbing cyanosis or edema  Skin: no rashes and no petechiae  Neuro: alert and oriented X 4, no focal deficits      LABS:                          9.5    2.26  )-----------( 55       ( 2021 07:26 )             28.7         Mean Cell Volume : 98.0 fl  Mean Cell Hemoglobin : 32.4 pg  Mean Cell Hemoglobin Concentration : 33.1 gm/dL  Auto Neutrophil # : x  Auto Lymphocyte # : x  Auto Monocyte # : x  Auto Eosinophil # : x  Auto Basophil # : x  Auto Neutrophil % : x  Auto Lymphocyte % : x  Auto Monocyte % : x  Auto Eosinophil % : x  Auto Basophil % : x      Serial CBC's   @ 07:26  Hct-28.7 / Hgb-9.5 / Plat-55 / RBC-2.93 / WBC-2.26  Serial CBC's   @ 10:55  Hct-28.5 / Hgb-9.5 / Plat-62 / RBC-2.90 / WBC-2.64  Serial CBC's   @ 06:23  Hct-28.1 / Hgb-9.1 / Plat-66 / RBC-2.87 / WBC-2.93  Serial CBC's   @ 13:13  Hct-35.4 / Hgb-11.7 / Plat-81 / RBC-3.67 / WBC-2.79          137  |  105  |  6<L>  ----------------------------<  110<H>  4.0   |  26  |  0.82    Ca    8.0<L>      2021 07:  Phos  3.2       Mg     1.9         TPro  5.8<L>  /  Alb  2.4<L>  /  TBili  1.6<H>  /  DBili  x   /  AST  31  /  ALT  15  /  AlkPhos  94        PT/INR - ( 2021 13:13 )   PT: 14.5 sec;   INR: 1.23 ratio         PTT - ( 2021 13:13 )  PTT:34.1 sec    Iron - Total Binding Capacity.: 284 ug/dL ( @ 07:26)  Iron - Total Binding Capacity.: 296 ug/dL ( @ 06:23)  Ferritin, Serum: 66 ng/mL ( @ 00:46)  Folate, Serum: 12.3 ng/mL ( @ 00:46)  Vitamin B12, Serum: 793 pg/mL ( @ 00:46)              BLOOD SMEAR INTERPRETATION:       RADIOLOGY & ADDITIONAL STUDIES:  < from: Xray Chest 1 View-PORTABLE IMMEDIATE (Xray Chest 1 View-PORTABLE IMMEDIATE .) (21 @ 13:53) >  INTERPRETATION:  CLINICAL INDICATION: 61 years  Female with seizure.    COMPARISON: 2020    AP view of the chest demonstrates mild pulmonary vascular prominence. There is a patchy right lower lobe infiltrate. There is no pleural effusion. There is no pneumothorax.    The heart is enlarged. The mediastinum and didier cannot be assessed due to rotation.    There is diffuse osteopenia.    IMPRESSION:    Mild pulmonary vascular congestion.    Patchy right lower lobe infiltrate.    < end of copied text >

## 2021-01-28 NOTE — BEHAVIORAL HEALTH ASSESSMENT NOTE - SUMMARY
60F,  and living with , with self-reported PHx of GARCIA on Xanax BID x 25 yrs (Rx'd by PCP [facility] NP) and binge alcohol use w/ h/o withdrawal seizures, MHx of HCV (s/p interferon in 1990s but still +), ?cirrhosis and migraine HA on [place] (also Rx'd by PCP), BIBEMS on 1/26 for witnessed sz en route to PCP's office to refill Xanax, found to be pancytopenic on admission. Primary team attributed sz to withdrawal, has been tx w/CIWA since adm (Librium taper started today) and has recommended pt have w/u for knee pain and pancytopenia, but pt is asking to leave AMA. Psych consulted for capacity to leave AMA. On exam, pt presents alert, oriented, linear and organized and denies SI/HI/AVH (confirmed by collateral from her ), but reports she is strongly attached to the Xanax she has been taking for 25 years and has no intention of stopping it. Given pt's clearly stated intention of restarting Xanax as soon as she is DC'd from this hospital, there is little to be gained from attempting to taper pt off Xanax during admission; however, pt can be maintained on standing Librium 25 mg q6h to prevent withdrawal while she is here. Pt DOES appear to have capacity to leave the hospital AMA, but this is now a moot point, as she is now agreeing to stay and complete the medical workup recommended by the primary team. Pt is psych cleared for DC home accompanied by her  as soon as she is medically cleared. Pt does not appear to present an acute risk of harm to self or others at the time of assessment, and does not appear to be in need of admission to IP psych at the time of assessment.

## 2021-01-28 NOTE — BEHAVIORAL HEALTH ASSESSMENT NOTE - NSBHCHARTREVIEWLAB_PSY_A_CORE FT
9.5    2.26  )-----------( 55       ( 28 Jan 2021 07:26 )             28.7   01-28    137  |  105  |  6<L>  ----------------------------<  110<H>  4.0   |  26  |  0.82    Ca    8.0<L>      28 Jan 2021 07:26  Phos  3.2     01-28  Mg     1.9     01-28    TPro  5.8<L>  /  Alb  2.4<L>  /  TBili  1.6<H>  /  DBili  x   /  AST  31  /  ALT  15  /  AlkPhos  94  01-28

## 2021-01-28 NOTE — PROGRESS NOTE ADULT - PROBLEM SELECTOR PLAN 6
-possible from malnutrition, ETOH, liver cirrhosis, splenomegaly  -f/u abdominal US  -NPO after midnight  -HemOnc Dr. Padgett

## 2021-01-28 NOTE — BEHAVIORAL HEALTH ASSESSMENT NOTE - NSBHCONSULTRECOMMENDOTHER_PSY_A_CORE FT
1. Recommend c/w Librium PO 25 mg q6h standing to prevent BZD withdrawal  --Do NOT recommend giving Librium PRNs or restarting home Xanax  2. No indication for other standing or PRN psychotropic medications at this time  3. Medical management as directed by primary team  4. Pt is psych cleared for D/C home as soon as she is medically optimized  5. Pt intends to return to the care of her PCP, Paulo Harding NP, to obtain refill Rx for Xanax  6. Psychiatry is signing off. Reconsult if additional issues arise as inpatient  7. Case d/w NP Arlene of primary team    Elizabeth Stewart MD  Director, Consultation-Liaison Psychiatry Service  [id number]

## 2021-01-28 NOTE — PROGRESS NOTE ADULT - SUBJECTIVE AND OBJECTIVE BOX
HPI:  Pt is 61 year old female  hx of seizure( not on any meds) HCV ?cirrhosis, newly diagnosed COPD not on inhalers, chronic migraines and anxiety on Xanax admitted with new onset witnessed seizure.  hx of seizure, not on any meds. Seizure witnessed with generalized tonic clonic movements 3-4 min and had another episode in ed. as per  she had 3-4 seizure in past but walked out AMA during last admission. Hx somewhat limited due to patient's mental status- medication effect vs postictal state. Alcohol consumption unclear, states she drinks every day 4-6 beers with last beer last night  but difficult to obtain further information about chronic consumption.  Of note ran out of Xanax 2 days ago. As per  collateral information obtained from : patient has been drinking heavily for many years at least a six pack of beer a day or every other day. As per  pt has been space out on and off for last 6-8 months and has been having severe panic attacks. Pt denies any drug abuse or any other acute finding. Pt was out of her xanax for 1 week.  (2021 16:15)      Patient is a 61y old  Female who presents with a chief complaint of seizure (2021 13:14)      INTERVAL HPI/OVERNIGHT EVENTS:  T(C): 37.2 (21 @ 05:15), Max: 37.2 (21 @ 05:15)  HR: 84 (21 @ 05:15) (84 - 96)  BP: 124/58 (21 @ 05:15) (120/57 - 134/72)  RR: 16 (21 @ 05:15) (16 - 18)  SpO2: 98% (21 @ 05:15) (97% - 100%)  Wt(kg): --  I&O's Summary      REVIEW OF SYSTEMS: denies fever, chills, SOB, palpitations, chest pain, abdominal pain, nausea, vomitting, diarrhea, constipation, dizziness    MEDICATIONS  (STANDING):  cefepime   IVPB 2000 milliGRAM(s) IV Intermittent every 8 hours  chlordiazePOXIDE 25 milliGRAM(s) Oral every 6 hours  cyanocobalamin 1000 MICROGram(s) Oral daily  enoxaparin Injectable 40 milliGRAM(s) SubCutaneous daily  folic acid 1 milliGRAM(s) Oral daily  lidocaine   Patch 2 Patch Transdermal daily  metroNIDAZOLE  IVPB 500 milliGRAM(s) IV Intermittent every 8 hours  multivitamin 1 Tablet(s) Oral daily  nicotine -  14 mG/24Hr(s) Patch 1 patch Transdermal daily  ondansetron Injectable 4 milliGRAM(s) IV Push two times a day  pantoprazole    Tablet 40 milliGRAM(s) Oral before breakfast  thiamine IVPB 500 milliGRAM(s) IV Intermittent three times a day    MEDICATIONS  (PRN):  acetaminophen  Suppository .. 650 milliGRAM(s) Rectal every 6 hours PRN Temp greater or equal to 38C (100.4F), Mild Pain (1 - 3)      PHYSICAL EXAM:  GENERAL: NAD, well-groomed, well-developed  HEAD:  Atraumatic, Normocephalic  EYES: EOMI, PERRLA, conjunctiva and sclera clear  ENMT: No tonsillar erythema, exudates, or enlargement; Moist mucous membranes, Good dentition, No lesions  NECK: Supple, No JVD, Normal thyroid  NERVOUS SYSTEM:  Alert & Oriented X3, Good concentration; Motor Strength 5/5 B/L upper and lower extremities; DTRs 2+ intact and symmetric  CHEST/LUNG: Clear to percussion bilaterally; No rales, rhonchi, wheezing, or rubs  HEART: Regular rate and rhythm; No murmurs, rubs, or gallops  ABDOMEN: Soft, Nontender, Nondistended; Bowel sounds present  EXTREMITIES:  2+ Peripheral Pulses, No clubbing, cyanosis, or edema  LYMPH: No lymphadenopathy noted  SKIN: No rashes or lesions  LABS:                        9.5    2.26  )-----------( 55       ( 2021 07:26 )             28.7         137  |  105  |  6<L>  ----------------------------<  110<H>  4.0   |  26  |  0.82    Ca    8.0<L>      2021 07:26  Phos  3.2       Mg     1.9         TPro  5.8<L>  /  Alb  2.4<L>  /  TBili  1.6<H>  /  DBili  x   /  AST  31  /  ALT  15  /  AlkPhos  94        Urinalysis Basic - ( 2021 22:58 )    Color: Yellow / Appearance: Clear / S.015 / pH: x  Gluc: x / Ketone: Negative  / Bili: Negative / Urobili: Negative   Blood: x / Protein: Negative / Nitrite: Negative   Leuk Esterase: Negative / RBC: x / WBC x   Sq Epi: x / Non Sq Epi: x / Bacteria: x      CAPILLARY BLOOD GLUCOSE      POCT Blood Glucose.: 101 mg/dL (2021 11:51)        Urinalysis Basic - ( 2021 22:58 )    Color: Yellow / Appearance: Clear / S.015 / pH: x  Gluc: x / Ketone: Negative  / Bili: Negative / Urobili: Negative   Blood: x / Protein: Negative / Nitrite: Negative   Leuk Esterase: Negative / RBC: x / WBC x   Sq Epi: x / Non Sq Epi: x / Bacteria: x
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
## 2021-01-28 NOTE — BEHAVIORAL HEALTH ASSESSMENT NOTE - HPI (INCLUDE ILLNESS QUALITY, SEVERITY, DURATION, TIMING, CONTEXT, MODIFYING FACTORS, ASSOCIATED SIGNS AND SYMPTOMS)
60F,  and living with , with self-reported PHx of GARCIA on Xanax BID x 25 yrs (Rx'd by PCP Paulo Harding NP) and binge alcohol use w/ h/o withdrawal seizures, MHx of HCV (s/p interferon in 1990s but still +), ?cirrhosis and migraine HA on [place] (also Rx'd by PCP), BIBEMS on 1/26 for witnessed sz en route to PCP's office to refill Xanax, found to be pancytopenic on admission. Primary team attributed sz to withdrawal, has been tx w/CIWA since adm (Librium taper started today) and has recommended pt have w/u for knee pain and pancytopenia, but pt is asking to leave AMA. Psych consulted for capacity to leave AMA. Pt seen in her room for eval, found lying in bed sleeping on approach but waking after encouragement and participating with interview. When asked about the circumstances of her admission, pt reports she was en route to PCP's office to obtain a new Rx for Xanax, which she ran out of on 1/18, "and then I don't remember what happened--my  says I had a seizure. We never made it to Dr. Hadley's office." Pt reports that she drinks "on and off--I only drink when I'm out of Xanax." Pt reports that she was dx with GARCIA 25 yrs ago by an MD in [place], where she grew up and used to live, and has been taking Xanax continuously for 25 years. Pt says she has tried other medications, including other BZDs such as Klonopin, but feels they do not work as well as Xanax. Pt reports that she was in therapy with a psychologist many years ago in [place] but has not otherwise seen a mental health provider in many years. Pt says firmly, "I'm not getting off my migraine meds or my Xanax," and she denies interest in BZD taper or referral for IP or OP RICE rehab. Pt reports that if she is DC'd, she intends to return to [facility] immediately and obtain new Rx for Xanax. Pt states that it is "no big deal" for her to abstain from alcohol as long as she has Xanax on hand. In 3 wks, pt reports that she and her  plan to move to [place]. Pt describes mood as "fine" and denies SI/HI/AVH, stating that she is aware of her medical situation (possible knee injury from fall, pancytopenia, chronic HCV infection ("it's just a little blip")) but does not believe she would be in any danger of death or serious injury if she left AMA and f/u as OP. Pt's  is called after encounter with her permission, and confirms pt's report that she has chronic severe GARCIA and has been on Xanax for decades.  corroborates pt's denial of SI, and states that he firmly believes pt would be safe if DC'd home in his care.  also confirms pt's report that the couple's condo (which  reports he inherited from his late mother) is in the process of being sold, and that he and pt plan to move to [place], [place] (near [place]) in 3 weeks.  initially states he plans to come to the hospital today to take pt home, but MD is later informed that pt has agreed to remain in the hospital to complete the recommended medical w/u.

## 2021-01-28 NOTE — DIETITIAN INITIAL EVALUATION ADULT. - FEEDING ASSISTANCE
HPI     NIDDM exam. X 2017  Decrease distance visual acuity sometimes.  Last eye exam 2018  Lab Results       Component                Value               Date                       LABA1C                   7.6                 04/25/2018                 HGBA1C                   7.1 (H)             08/27/2020                Last edited by Fred Batista, OD on 11/2/2020  2:05 PM. (History)            Assessment /Plan     For exam results, see Encounter Report.    Diabetes mellitus type 2 without retinopathy    Myopia, bilateral      No Background Diabetic Retinopathy    Dispense Final Rx for glasses. prn  RTC 1 year  Discussed above and answered questions.                   
Nursing to continue feeding assistance and encouragement, aspiration precaution

## 2021-01-28 NOTE — DIETITIAN INITIAL EVALUATION ADULT. - PROBLEM SELECTOR PLAN 1
Pt p/w seizure at dr office  on exam has some tongue bites and lethargic s/p ativan in ed  pt on no seizure meds  before this last seizure was in july 2020  s/p keppra loading   started on standing ativan   benzodiazepin withdrwl seizure ? vs seizure foci in brain  vs alcohol withdrawl seizure  neurocheck   Neurology Dr. KUHN

## 2021-01-28 NOTE — BEHAVIORAL HEALTH ASSESSMENT NOTE - NSBHSOCIALHXDETAILSFT_PSY_A_CORE
B/R in [place].  and  in [place] many years ago; has 2 adult sons in their 30s who live in [place].  current  22 yo ago, no children. Moved to [place] for 5 years to care for mother during terminal illness; returned to [place] about 12 yrs ago after mother's death.  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 Plans to move to [place], [place] with  in 2/2021.

## 2021-01-29 DIAGNOSIS — E87.6 HYPOKALEMIA: ICD-10-CM

## 2021-01-29 LAB
ANION GAP SERPL CALC-SCNC: 5 MMOL/L — SIGNIFICANT CHANGE UP (ref 5–17)
BUN SERPL-MCNC: 6 MG/DL — LOW (ref 7–18)
CALCIUM SERPL-MCNC: 8.1 MG/DL — LOW (ref 8.4–10.5)
CHLORIDE SERPL-SCNC: 106 MMOL/L — SIGNIFICANT CHANGE UP (ref 96–108)
CO2 SERPL-SCNC: 28 MMOL/L — SIGNIFICANT CHANGE UP (ref 22–31)
CREAT SERPL-MCNC: 0.78 MG/DL — SIGNIFICANT CHANGE UP (ref 0.5–1.3)
GLUCOSE SERPL-MCNC: 103 MG/DL — HIGH (ref 70–99)
HCT VFR BLD CALC: 30.8 % — LOW (ref 34.5–45)
HGB BLD-MCNC: 10.1 G/DL — LOW (ref 11.5–15.5)
MAGNESIUM SERPL-MCNC: 1.7 MG/DL — SIGNIFICANT CHANGE UP (ref 1.6–2.6)
MCHC RBC-ENTMCNC: 32.4 PG — SIGNIFICANT CHANGE UP (ref 27–34)
MCHC RBC-ENTMCNC: 32.8 GM/DL — SIGNIFICANT CHANGE UP (ref 32–36)
MCV RBC AUTO: 98.7 FL — SIGNIFICANT CHANGE UP (ref 80–100)
NRBC # BLD: 0 /100 WBCS — SIGNIFICANT CHANGE UP (ref 0–0)
PHOSPHATE SERPL-MCNC: 3.5 MG/DL — SIGNIFICANT CHANGE UP (ref 2.5–4.5)
PLATELET # BLD AUTO: 70 K/UL — LOW (ref 150–400)
POTASSIUM SERPL-MCNC: 4.3 MMOL/L — SIGNIFICANT CHANGE UP (ref 3.5–5.3)
POTASSIUM SERPL-SCNC: 4.3 MMOL/L — SIGNIFICANT CHANGE UP (ref 3.5–5.3)
RBC # BLD: 3.12 M/UL — LOW (ref 3.8–5.2)
RBC # FLD: 13.6 % — SIGNIFICANT CHANGE UP (ref 10.3–14.5)
SODIUM SERPL-SCNC: 139 MMOL/L — SIGNIFICANT CHANGE UP (ref 135–145)
WBC # BLD: 2.4 K/UL — LOW (ref 3.8–10.5)
WBC # FLD AUTO: 2.4 K/UL — LOW (ref 3.8–10.5)

## 2021-01-29 PROCEDURE — 73700 CT LOWER EXTREMITY W/O DYE: CPT | Mod: 26,50

## 2021-01-29 PROCEDURE — 76705 ECHO EXAM OF ABDOMEN: CPT | Mod: 26

## 2021-01-29 PROCEDURE — 76376 3D RENDER W/INTRP POSTPROCES: CPT | Mod: 26

## 2021-01-29 RX ORDER — METRONIDAZOLE 500 MG
500 TABLET ORAL EVERY 8 HOURS
Refills: 0 | Status: DISCONTINUED | OUTPATIENT
Start: 2021-01-29 | End: 2021-01-30

## 2021-01-29 RX ORDER — KETOROLAC TROMETHAMINE 30 MG/ML
15 SYRINGE (ML) INJECTION ONCE
Refills: 0 | Status: DISCONTINUED | OUTPATIENT
Start: 2021-01-29 | End: 2021-01-29

## 2021-01-29 RX ORDER — CEFUROXIME AXETIL 250 MG
500 TABLET ORAL EVERY 12 HOURS
Refills: 0 | Status: DISCONTINUED | OUTPATIENT
Start: 2021-01-29 | End: 2021-01-30

## 2021-01-29 RX ORDER — ACETAMINOPHEN 500 MG
650 TABLET ORAL EVERY 6 HOURS
Refills: 0 | Status: DISCONTINUED | OUTPATIENT
Start: 2021-01-29 | End: 2021-01-30

## 2021-01-29 RX ORDER — LANOLIN ALCOHOL/MO/W.PET/CERES
5 CREAM (GRAM) TOPICAL ONCE
Refills: 0 | Status: COMPLETED | OUTPATIENT
Start: 2021-01-29 | End: 2021-01-29

## 2021-01-29 RX ADMIN — ENOXAPARIN SODIUM 40 MILLIGRAM(S): 100 INJECTION SUBCUTANEOUS at 11:51

## 2021-01-29 RX ADMIN — LIDOCAINE 2 PATCH: 4 CREAM TOPICAL at 11:50

## 2021-01-29 RX ADMIN — Medication 100 MILLIGRAM(S): at 05:46

## 2021-01-29 RX ADMIN — Medication 105 MILLIGRAM(S): at 13:00

## 2021-01-29 RX ADMIN — Medication 100 MILLIGRAM(S): at 14:00

## 2021-01-29 RX ADMIN — Medication 1 PATCH: at 19:43

## 2021-01-29 RX ADMIN — Medication 1 MILLIGRAM(S): at 11:54

## 2021-01-29 RX ADMIN — Medication 25 MILLIGRAM(S): at 05:45

## 2021-01-29 RX ADMIN — Medication 1 TABLET(S): at 11:51

## 2021-01-29 RX ADMIN — Medication 1 PATCH: at 11:55

## 2021-01-29 RX ADMIN — Medication 1 PATCH: at 11:51

## 2021-01-29 RX ADMIN — Medication 15 MILLIGRAM(S): at 21:47

## 2021-01-29 RX ADMIN — PREGABALIN 1000 MICROGRAM(S): 225 CAPSULE ORAL at 11:51

## 2021-01-29 RX ADMIN — Medication 500 MILLIGRAM(S): at 21:47

## 2021-01-29 RX ADMIN — Medication 25 MILLIGRAM(S): at 11:53

## 2021-01-29 RX ADMIN — Medication 5 MILLIGRAM(S): at 21:47

## 2021-01-29 RX ADMIN — Medication 5 MILLIGRAM(S): at 02:09

## 2021-01-29 RX ADMIN — CEFEPIME 100 MILLIGRAM(S): 1 INJECTION, POWDER, FOR SOLUTION INTRAMUSCULAR; INTRAVENOUS at 05:48

## 2021-01-29 RX ADMIN — Medication 500 MILLIGRAM(S): at 17:45

## 2021-01-29 RX ADMIN — Medication 25 MILLIGRAM(S): at 17:32

## 2021-01-29 RX ADMIN — Medication 1 PATCH: at 07:00

## 2021-01-29 RX ADMIN — Medication 650 MILLIGRAM(S): at 16:48

## 2021-01-29 RX ADMIN — LIDOCAINE 2 PATCH: 4 CREAM TOPICAL at 18:43

## 2021-01-29 RX ADMIN — PANTOPRAZOLE SODIUM 40 MILLIGRAM(S): 20 TABLET, DELAYED RELEASE ORAL at 05:45

## 2021-01-29 RX ADMIN — Medication 105 MILLIGRAM(S): at 05:46

## 2021-01-29 NOTE — PHYSICAL THERAPY INITIAL EVALUATION ADULT - TRANSFER SAFETY CONCERNS NOTED: SIT/STAND, REHAB EVAL
decreased balance during turns/decreased safety awareness/losing balance/inability to maintain weight-bearing restrictions w/o assist

## 2021-01-29 NOTE — SWALLOW BEDSIDE ASSESSMENT ADULT - SWALLOW EVAL: DIAGNOSIS
Oropharyngeal swallow was intact and timely with no overt s/s of laryngeal penetration or aspiration at this exam.

## 2021-01-29 NOTE — DISCHARGE NOTE PROVIDER - NSDCMRMEDTOKEN_GEN_ALL_CORE_FT
meloxicam 15 mg oral tablet: 1 tab(s) orally once a day  Norco 5 mg-325 mg oral tablet: 1 tab(s) orally once a day, As Needed  Xanax 1 mg oral tablet: 1 tab(s) orally once a day   cefuroxime 500 mg oral tablet: 1 tab(s) orally every 12 hours  cyanocobalamin 1000 mcg oral tablet: 1 tab(s) orally once a day  folic acid 1 mg oral tablet: 1 tab(s) orally once a day  lidocaine 5% topical film: Apply topically to affected area once a day   metroNIDAZOLE 500 mg oral tablet: 1 tab(s) orally every 8 hours  Multiple Vitamins oral tablet: 1 tab(s) orally once a day  nicotine 14 mg/24 hr transdermal film, extended release: 1 patch transdermal once a day   pantoprazole 40 mg oral delayed release tablet: 1 tab(s) orally once a day (before a meal)   cefuroxime 500 mg oral tablet: 1 tab(s) orally every 12 hours  chlordiazePOXIDE 25 mg oral capsule: 4 cap(s) orally 4 times a day x 1 days  3 cap(s) orally 4 times a day x 3 days  2 cap(s) orally 4 times a day x 3 days  1 cap(s) orally 4 times a day x 3 days MDD:tapering dose: then stop 400mg, 300mg, 200mg then 100mg  cyanocobalamin 1000 mcg oral tablet: 1 tab(s) orally once a day  folic acid 1 mg oral tablet: 1 tab(s) orally once a day  lidocaine 5% topical film: Apply topically to affected area once a day   metroNIDAZOLE 500 mg oral tablet: 1 tab(s) orally every 8 hours  Multiple Vitamins oral tablet: 1 tab(s) orally once a day  nicotine 14 mg/24 hr transdermal film, extended release: 1 patch transdermal once a day   pantoprazole 40 mg oral delayed release tablet: 1 tab(s) orally once a day (before a meal)

## 2021-01-29 NOTE — SWALLOW BEDSIDE ASSESSMENT ADULT - SWALLOW EVAL: RECOMMENDED FEEDING/EATING TECHNIQUES
allow for swallow between intakes/maintain upright posture during/after eating for 30 mins/position upright (90 degrees)

## 2021-01-29 NOTE — PROGRESS NOTE ADULT - PROVIDER SPECIALTY LIST ADULT
Internal Medicine
Heme/Onc
Internal Medicine

## 2021-01-29 NOTE — DISCHARGE NOTE PROVIDER - CARE PROVIDER_API CALL
ERIN ESPINOZA  Internal Medicine  [address] [place] [address]  Phone: [phone]  Fax: [phone]  Follow Up Time: 1-3 days   ERIN ESPINOZA  Internal Medicine  [address] [place] [address]  Phone: [phone]  Fax: [phone]  Follow Up Time: 1-3 days    Sundeep Fournier  [address] [place] [address]  Phone: [phone]  Fax: (   )    -  Follow Up Time: 1 week

## 2021-01-29 NOTE — PROGRESS NOTE ADULT - PROBLEM SELECTOR PLAN 7
-BMI: 21.5  -Albumin:2.4  -ETOH abuse  -Nutrition consulting
-BMI: 21.5  -Albumin:2.4  -ETOH abuse  -Nutrition consulting
-DVT PPX: Lovenox  -GI PPX: PPI

## 2021-01-29 NOTE — PROGRESS NOTE ADULT - ASSESSMENT
Patient is a 61 year old female from home with PMH of ETOH abuse, HCV, COPD, chronic migraines and anxiety (Xanax), seizure (ETOH induced). Seizure witnessed with generalized tonic clonic movements 3-4 min. Patient had another episode of seizure in ED. As per  Patient had 3-4 seizure in past but walked out AMA during last admission. Alcohol consumption unclear, states she drinks every day 4-6 beers with last beer night before admission. Patient admitted for seizure secondary to ETOH abuse. 
Patient is a 61 year old female from home with PMH of ETOH abuse, HCV, COPD, chronic migraines and anxiety (Xanax), seizure (ETOH induced). Seizure witnessed with generalized tonic clonic movements 3-4 min. Patient had another episode of seizure in ED. As per  Patient had 3-4 seizure in past but walked out AMA during last admission. Alcohol consumption unclear, states she drinks every day 4-6 beers with last beer night before admission. Patient admitted for seizure secondary to ETOH abuse. 
seen and examined awake not in any distress  no tremers awake    lungs clear vsstabel  no cough  ct of knee    ID for asp pneumonia
seen and examined vs stable afebrile physiacal  done  lithargic   not in any distress.     no seizure   holding hand , moving extremities.  labs noted  Pancytopenia, bun 7,  hep c pos  ct head neg  cocain POS  h/o etoh cocain abuse came with new onset seizure neuro, eeg,  AA program, ? drug rehab
seen and examined vsstable afebrile  physical unchanged  no abd pain  no cp or sob  labs noted    aspiration pneumonia  on cefepime and flagyl  ID   speech and swallow  pancytopenia  hematology  
· Assessment	  61 year old lady with copd, seizure, alcoholism on xanax.  she drinkes 4-6 beer a day.  The last drink was 3 days ago.  She has been on xanax but ran out of supply 1 week ago.  she was admitted for seizure.  But pancytopenia was found.    1. pancytopenia  it can be from alcoholism or liver cirrhosis and splenomegaly  will do US of abdomen  check b12, folate, ferritin  2. seizure  ?alcohol withdrawal or xanax withdrawal  should be on detox protocol  3. ?RLL infiltrate  ?aspiration  on antibiotics  
[Patient] : patient
Patient is a 61 year old female from home with PMH of ETOH abuse, HCV, COPD, chronic migraines and anxiety (Xanax), seizure (ETOH induced). Seizure witnessed with generalized tonic clonic movements 3-4 min. Patient had another episode of seizure in ED. As per  Patient had 3-4 seizure in past but walked out AMA during last admission. Alcohol consumption unclear, states she drinks every day 4-6 beers with last beer night before admission. Patient admitted for seizure secondary to ETOH abuse.

## 2021-01-29 NOTE — SWALLOW BEDSIDE ASSESSMENT ADULT - SWALLOW EVAL: FEEDING ASSISTANCE
Group Topic:  Therapeutic Activity    Date: June 28  Start Time: 11:10 AM  End Time: 11:55 AM    Focus: Emotional Regulation, opposite action assists individuals to overcome their addiction by learning new ways of thinking and behaving which can contribute to getting a person out of their addiction. Skill training activity will provide patient the opportunity to diagnose the causes of their problems and come up with appropriate methods to solve them.   Number in attendance: 10 + 2 residents    Pt arrived to group late (at 1125) and left group early.    no assistance needed

## 2021-01-29 NOTE — SWALLOW BEDSIDE ASSESSMENT ADULT - SLP PERTINENT HISTORY OF CURRENT PROBLEM
62 y/o F. PMHx seizure (not on meds) HCV, cirrhosis, newly diagnosed COPD not on inhalers, chronic migraines and anxiety on Xanax admitted with new onset witnessed seizure. Reportedly, seizure witnessed with generalized tonic clonic movements 3-4 min and had another episode in ED.As per  pt had 3-4 seizure in past, walked out AMA during last admit. Alcohol consumption unclear, as per pt, drinks every day 4-6 beers with last beer last night 1/26 but difficult to obtain further info about chronic consumption. Reportedly, ran out of Xanax 2 days prior to admit. As per  pt has been drinking heavily for many years at least a six pack of beer a day or every other day. As per  pt has been space out on and off for last 6-8 months and has been having severe panic attacks. Pt denies any drug abuse or any other acute finding. Pt was out of her xanax for 1 week.

## 2021-01-29 NOTE — DISCHARGE NOTE PROVIDER - HOSPITAL COURSE
Patient is a 61 year old female from home with PMH of ETOH abuse, HCV, COPD, chronic migraines and anxiety (Xanax), seizure (ETOH induced). Seizure witnessed with generalized tonic clonic movements 3-4 min. Patient had another episode of seizure in ED. As per  Patient had 3-4 seizure in past but walked out AMA during last admission. Alcohol consumption unclear, states she drinks every day 4-6 beers with last beer night before admission. Patient admitted for seizure secondary to ETOH abuse. EEG no epileptiform or seizure seen, no need for seizure RX, recommended by Neurology, primary seizure. Aspiration pneumonia, afebrile, no leukocytosis, chest x-ray- Patchy right lower lobe infiltrate treated with IV antibiotic, ID following. Speech and swallow evaluation cleared for regular diet. Patient history of Benzodiazepine and ETOH abuse, treated with Librium 25mg PO every 6 hrs and thiamin, MV, FA, as recommended by Psych. Home dose Xanax held, patient will get refill from outpatient provider. Patient cleared for capacity with Psych for safe discharge once medically cleared,  Psych Dr. Stewart. CT b/l knee - Mildly displaced and depressed Schatzker type I fracture of the right knee lateral tibial plateau, Left knee no fracture, Ortho recommending conservative management with outpatient follow up. Patient found to have pancytopenia, abdominal US limited showing - Mild fatty infiltration of liver with mild nodularity and slight enlargement measuring 17 cm and [id number]. Patient moderate protein-calorie malnutrition, BMI: 21.5, Albumin:2.4, ETOH abuse.     Incomplete 1/29/21     Patient is a 61 year old female from home with PMH of ETOH abuse, HCV, COPD, chronic migraines and anxiety (Xanax), seizure (ETOH induced). Seizure witnessed with generalized tonic clonic movements 3-4 min. Patient had another episode of seizure in ED. As per  Patient had 3-4 seizure in past but walked out AMA during last admission. Alcohol consumption unclear, states she drinks every day 4-6 beers with last beer night before admission. Patient admitted for seizure secondary to ETOH abuse. EEG no epileptiform or seizure seen, no need for seizure RX, recommended by Neurology, primary seizure. Aspiration pneumonia, afebrile, no leukocytosis, chest x-ray- Patchy right lower lobe infiltrate treated with IV antibiotic, ID following. Speech and swallow evaluation cleared for regular diet. Patient history of Benzodiazepine and ETOH abuse, treated with Librium 25mg PO every 6 hrs and thiamin, MV, FA, as recommended by Psych. Home dose Xanax held, patient will get refill from outpatient provider. Patient cleared for capacity with Psych for safe discharge once medically cleared,  Psych Dr. Stewart. CT b/l knee - Mildly displaced and depressed Schatzker type I fracture of the right knee lateral tibial plateau, Left knee no fracture, Ortho recommending conservative management with outpatient follow up. Patient found to have pancytopenia, abdominal US limited showing - Mild fatty infiltration of liver with mild nodularity and slight enlargement measuring 17 cm, Patient to follow up outpatient with Dr. Padgett for further management. Patient moderate protein-calorie malnutrition, BMI: 21.5, Albumin:2.4, ETOH abuse.     Incomplete 1/29/21

## 2021-01-29 NOTE — PROGRESS NOTE ADULT - PROBLEM SELECTOR PLAN 2
-afebrile, no leukocytosis  -chest x-ray- Patchy right lower lobe infiltrate.  -speech and swallow evaluation  -c/w cefepime and Metronidazole  -ID Dr. Murray -afebrile, no leukocytosis  -chest x-ray- Patchy right lower lobe infiltrate.  -speech and swallow evaluation regular diet  -drug adjusted-> Ceftin 500mg PO BID, Flagyl 500mg every 8hrs, for total 5 days  -ID Dr. Murray

## 2021-01-29 NOTE — PROGRESS NOTE ADULT - PROBLEM SELECTOR PLAN 9
full code
full code
-PT evaluation pending  -Speech and swallow pending  -CIWA treated with Lorazepam

## 2021-01-29 NOTE — PROGRESS NOTE ADULT - SUBJECTIVE AND OBJECTIVE BOX
NP Note discussed with  Primary Attending    Patient is a 61y old  Female who presents with a chief complaint of seizure (29 Jan 2021 09:19)      INTERVAL HPI/OVERNIGHT EVENTS: Patient seen and examined at bedside,. Patient attempts to get OOB by herself, explained to patient to call for assistance. Patient able to follow command.     MEDICATIONS  (STANDING):  cefepime   IVPB 1000 milliGRAM(s) IV Intermittent every 12 hours  chlordiazePOXIDE 25 milliGRAM(s) Oral every 6 hours  cyanocobalamin 1000 MICROGram(s) Oral daily  enoxaparin Injectable 40 milliGRAM(s) SubCutaneous daily  folic acid 1 milliGRAM(s) Oral daily  lidocaine   Patch 2 Patch Transdermal daily  metroNIDAZOLE  IVPB 500 milliGRAM(s) IV Intermittent every 8 hours  multivitamin 1 Tablet(s) Oral daily  nicotine -  14 mG/24Hr(s) Patch 1 patch Transdermal daily  ondansetron Injectable 4 milliGRAM(s) IV Push two times a day  pantoprazole    Tablet 40 milliGRAM(s) Oral before breakfast  thiamine IVPB 500 milliGRAM(s) IV Intermittent three times a day    MEDICATIONS  (PRN):  acetaminophen  Suppository .. 650 milliGRAM(s) Rectal every 6 hours PRN Temp greater or equal to 38C (100.4F), Mild Pain (1 - 3)      __________________________________________________  REVIEW OF SYSTEMS:    CONSTITUTIONAL: No fever,   EYES: no acute visual disturbances  NECK: No pain or stiffness  RESPIRATORY: No cough; No shortness of breath  CARDIOVASCULAR: No chest pain, no palpitations  GASTROINTESTINAL: No pain. No nausea or vomiting; No diarrhea   NEUROLOGICAL: No headache or numbness, no tremors  MUSCULOSKELETAL: right knee pain  GENITOURINARY: no dysuria, no frequency, no hesitancy  PSYCHIATRY: no depression , no anxiety  ALL OTHER  ROS negative        Vital Signs Last 24 Hrs  T(C): 36.9 (29 Jan 2021 05:17), Max: 36.9 (29 Jan 2021 05:17)  T(F): 98.4 (29 Jan 2021 05:17), Max: 98.4 (29 Jan 2021 05:17)  HR: 90 (29 Jan 2021 05:17) (90 - 100)  BP: 107/59 (29 Jan 2021 05:17) (107/55 - 107/59)  BP(mean): --  RR: 16 (29 Jan 2021 05:17) (16 - 17)  SpO2: 94% (29 Jan 2021 05:17) (94% - 100%)    ________________________________________________  PHYSICAL EXAM:  GENERAL: NAD  HEENT: Normocephalic;  conjunctivae and sclerae clear; moist mucous membranes;   NECK : supple  CHEST/LUNG: Clear to auscultation bilaterally with good air entry   HEART: S1 S2  regular; no murmurs, gallops or rubs  ABDOMEN: Soft, Nontender, Nondistended; Bowel sounds present  EXTREMITIES: no cyanosis; no edema; no calf tenderness  SKIN: warm and dry; no rash  NERVOUS SYSTEM:  Awake and alert; Oriented  to place, person and time ; no new deficits    _________________________________________________  LABS:                        10.1   2.40  )-----------( 70       ( 29 Jan 2021 06:40 )             30.8     01-29    139  |  106  |  6<L>  ----------------------------<  103<H>  4.3   |  28  |  0.78    Ca    8.1<L>      29 Jan 2021 06:40  Phos  3.5     01-29  Mg     1.7     01-29    TPro  5.8<L>  /  Alb  2.4<L>  /  TBili  1.6<H>  /  DBili  x   /  AST  31  /  ALT  15  /  AlkPhos  94  01-28        CAPILLARY BLOOD GLUCOSE    RADIOLOGY & ADDITIONAL TESTS:  < from: Xray Knee 3 Views, Right (01.28.21 @ 12:57) >  EXAM:  KNEE AP.LAT&OBL.RIGHT                            PROCEDURE DATE:  01/28/2021          INTERPRETATION:  CLINICAL STATEMENT: Pain. Fall.    TECHNIQUE: AP, lateral and oblique views of right knee.    COMPARISON: None.    FINDINGS:  No dislocation.  Chondrocalcinosis noted. Multi compartmental degenerative changes.    Questionable nondisplaced fracture lateral tibial plateau on the AP view.    IMPRESSION:  Questionable nondisplaced fracture lateral tibial plateau on the AP view. CT right knee recommended for better evaluation    < end of copied text >  < from: CT Head No Cont (01.27.21 @ 01:21) >    EXAM:  CT BRAIN                            PROCEDURE DATE:  01/27/2021          INTERPRETATION:  CLINICAL INFORMATION:  seizure    TECHNIQUE:  Axial CT images were acquired through the head.  Intravenous contrast: None  Two-dimensional reformats were generated.    COMPARISON STUDY: CT head 7/5/2020.    FINDINGS:    There is no CT evidence of acute intracranial hemorrhage, mass effect, midline shift, or acute, large transcortical infarct.  Few scattered periventricular and subcortical white matter hypodensities are nonspecific, although likely on the basis of mild chronic microangiopathy. The ventricles and sulci are normal in size and configuration. The basal cisterns are patent.    There are atherosclerotic calcifications of the carotid siphons.    The mastoid air cells and middle ear cavities are grossly clear. The visualized paranasal sinuses are well aerated.    The calvarium and skull base are grossly intact.    IMPRESSION:  No acute intracranial hemorrhage or mass effect.      < end of copied text >  < from: Xray Chest 1 View-PORTABLE IMMEDIATE (Xray Chest 1 View-PORTABLE IMMEDIATE .) (01.26.21 @ 13:53) >    EXAM:  XR CHEST PORTABLE IMMED 1V                            PROCEDURE DATE:  01/26/2021          INTERPRETATION:  CLINICAL INDICATION: 61 years  Female with seizure.    COMPARISON: 7/5/2020    AP view of the chest demonstrates mild pulmonary vascular prominence. There is a patchy right lower lobe infiltrate. There is no pleural effusion. There is no pneumothorax.    The heart is enlarged. The mediastinum and didier cannot be assessed due to rotation.    There is diffuse osteopenia.    IMPRESSION:    Mild pulmonary vascular congestion.    Patchy right lower lobe infiltrate.    < end of copied text >    Imaging  Reviewed:  YES    Consultant(s) Notes Reviewed:   YES      Plan of care was discussed with patient and /or primary care giver; all questions and concerns were addressed  NP Note discussed with  Primary Attending    Patient is a 61y old  Female who presents with a chief complaint of seizure (29 Jan 2021 09:19)      INTERVAL HPI/OVERNIGHT EVENTS: Patient seen and examined at bedside,. Patient attempts to get OOB by herself, explained to patient to call for assistance. Patient able to follow command.     MEDICATIONS  (STANDING):  cefepime   IVPB 1000 milliGRAM(s) IV Intermittent every 12 hours  chlordiazePOXIDE 25 milliGRAM(s) Oral every 6 hours  cyanocobalamin 1000 MICROGram(s) Oral daily  enoxaparin Injectable 40 milliGRAM(s) SubCutaneous daily  folic acid 1 milliGRAM(s) Oral daily  lidocaine   Patch 2 Patch Transdermal daily  metroNIDAZOLE  IVPB 500 milliGRAM(s) IV Intermittent every 8 hours  multivitamin 1 Tablet(s) Oral daily  nicotine -  14 mG/24Hr(s) Patch 1 patch Transdermal daily  ondansetron Injectable 4 milliGRAM(s) IV Push two times a day  pantoprazole    Tablet 40 milliGRAM(s) Oral before breakfast  thiamine IVPB 500 milliGRAM(s) IV Intermittent three times a day    MEDICATIONS  (PRN):  acetaminophen  Suppository .. 650 milliGRAM(s) Rectal every 6 hours PRN Temp greater or equal to 38C (100.4F), Mild Pain (1 - 3)      __________________________________________________  REVIEW OF SYSTEMS:    CONSTITUTIONAL: No fever,   EYES: no acute visual disturbances  NECK: No pain or stiffness  RESPIRATORY: No cough; No shortness of breath  CARDIOVASCULAR: No chest pain, no palpitations  GASTROINTESTINAL: No pain. No nausea or vomiting; No diarrhea   NEUROLOGICAL: No headache or numbness, no tremors  MUSCULOSKELETAL: right knee pain  GENITOURINARY: no dysuria, no frequency, no hesitancy  PSYCHIATRY: no depression , no anxiety  ALL OTHER  ROS negative        Vital Signs Last 24 Hrs  T(C): 36.9 (29 Jan 2021 05:17), Max: 36.9 (29 Jan 2021 05:17)  T(F): 98.4 (29 Jan 2021 05:17), Max: 98.4 (29 Jan 2021 05:17)  HR: 90 (29 Jan 2021 05:17) (90 - 100)  BP: 107/59 (29 Jan 2021 05:17) (107/55 - 107/59)  BP(mean): --  RR: 16 (29 Jan 2021 05:17) (16 - 17)  SpO2: 94% (29 Jan 2021 05:17) (94% - 100%)    ________________________________________________  PHYSICAL EXAM:  GENERAL: NAD  HEENT: Normocephalic;  conjunctivae and sclerae clear; moist mucous membranes;   NECK : supple  CHEST/LUNG: Clear to auscultation bilaterally with good air entry   HEART: S1 S2  regular; no murmurs, gallops or rubs  ABDOMEN: Soft, Nontender, Nondistended; Bowel sounds present  EXTREMITIES: no cyanosis; no edema; no calf tenderness  SKIN: warm and dry; no rash  NERVOUS SYSTEM:  Awake and alert; Oriented  to place, person and time ; no new deficits    _________________________________________________  LABS:                        10.1   2.40  )-----------( 70       ( 29 Jan 2021 06:40 )             30.8     01-29    139  |  106  |  6<L>  ----------------------------<  103<H>  4.3   |  28  |  0.78    Ca    8.1<L>      29 Jan 2021 06:40  Phos  3.5     01-29  Mg     1.7     01-29    TPro  5.8<L>  /  Alb  2.4<L>  /  TBili  1.6<H>  /  DBili  x   /  AST  31  /  ALT  15  /  AlkPhos  94  01-28        CAPILLARY BLOOD GLUCOSE    RADIOLOGY & ADDITIONAL TESTS:  < from: Xray Knee 3 Views, Right (01.28.21 @ 12:57) >  EXAM:  KNEE AP.LAT&OBL.RIGHT                            PROCEDURE DATE:  01/28/2021          INTERPRETATION:  CLINICAL STATEMENT: Pain. Fall.    TECHNIQUE: AP, lateral and oblique views of right knee.    COMPARISON: None.    FINDINGS:  No dislocation.  Chondrocalcinosis noted. Multi compartmental degenerative changes.    Questionable nondisplaced fracture lateral tibial plateau on the AP view.    IMPRESSION:  Questionable nondisplaced fracture lateral tibial plateau on the AP view. CT right knee recommended for better evaluation    < end of copied text >  < from: CT Head No Cont (01.27.21 @ 01:21) >    EXAM:  CT BRAIN                            PROCEDURE DATE:  01/27/2021          INTERPRETATION:  CLINICAL INFORMATION:  seizure    TECHNIQUE:  Axial CT images were acquired through the head.  Intravenous contrast: None  Two-dimensional reformats were generated.    COMPARISON STUDY: CT head 7/5/2020.    FINDINGS:    There is no CT evidence of acute intracranial hemorrhage, mass effect, midline shift, or acute, large transcortical infarct.  Few scattered periventricular and subcortical white matter hypodensities are nonspecific, although likely on the basis of mild chronic microangiopathy. The ventricles and sulci are normal in size and configuration. The basal cisterns are patent.    There are atherosclerotic calcifications of the carotid siphons.    The mastoid air cells and middle ear cavities are grossly clear. The visualized paranasal sinuses are well aerated.    The calvarium and skull base are grossly intact.    IMPRESSION:  No acute intracranial hemorrhage or mass effect.      < end of copied text >  < from: Xray Chest 1 View-PORTABLE IMMEDIATE (Xray Chest 1 View-PORTABLE IMMEDIATE .) (01.26.21 @ 13:53) >    EXAM:  XR CHEST PORTABLE IMMED 1V                            PROCEDURE DATE:  01/26/2021          INTERPRETATION:  CLINICAL INDICATION: 61 years  Female with seizure.    COMPARISON: 7/5/2020    AP view of the chest demonstrates mild pulmonary vascular prominence. There is a patchy right lower lobe infiltrate. There is no pleural effusion. There is no pneumothorax.    The heart is enlarged. The mediastinum and didier cannot be assessed due to rotation.    There is diffuse osteopenia.    IMPRESSION:    Mild pulmonary vascular congestion.    Patchy right lower lobe infiltrate.    < end of copied text >  < from: US Abdomen Limited (01.29.21 @ 11:36) >    EXAM:  US ABDOMEN LIMITED                            PROCEDURE DATE:  01/29/2021          INTERPRETATION:  CLINICAL INFORMATION: EtOH abuse, hep C    COMPARISON: None available.    TECHNIQUE: Sonography of the right upper quadrant.    FINDINGS:    Liver: Mild fatty infiltration, Mild nodularity, slightly enlarged  Bile ducts: Normal caliber. Common bile duct measures 3 mm.  Gallbladder: Within normal limits.  Pancreas: Visualized limited portions are within normal limits.  Right kidney: 10.0 cm. No hydronephrosis.  Ascites: None.  IVC: Visualized portions are within normal limits.    IMPRESSION:    Mild fatty infiltration of liver with mild nodularity and slight enlargement measuring 17 cm.    < end of copied text >  < from: CT 3D Reconstruct w/o Workstation (01.29.21 @ 11:24) >    EXAM:  CT 3D RECONSTRUCT [facility]                          EXAM:  CT KNEE ONLY BI                            PROCEDURE DATE:  01/29/2021          INTERPRETATION:  CT KNEE BILATERAL, CT 3D RECONSTRUCTION WO WORKSTATION    HISTORY: Fell 3 days ago with bilateral knee pain.    TECHNIQUE: Contiguous axial imaging was performed through the bilateral knees. Coronal and sagittal reformatting was utilized. 3-D reformatting was also performed and the findings are compatible with the planar imaging.    COMPARISON:  Right knee x-rays dated January 28, 2021.    FINDINGS:    RIGHT KNEE:  OSSEOUS STRUCTURES    Acute/Chronic Fractures:  Mildly displaced and depressed Schatzker type I lateral tibial plateau fracture is confirmed on this exam. Up to 0.3cm gap is seen along the anterior margin of the fracture site. There is approximately 0.2 cm of depression.    LIGAMENTS AND TENDONS    Cruciate Ligaments:  Intact within limits of CT.    Extensor Tendon Complex:  Intact within limits of CT. Suprapatellar enthesophytes are noted.    Medial Collateral Ligament:  Intact within limits of CT.    Posterolateral Corner Structures:  Intact within limits of CT. Popliteal tendon and lateral collateral ligament calcifications are noted at the femoral attachment.    MEDIAL COMPARTMENT    Osteoarthritic Changes:  Joint space is relatively maintained although small marginal osteophytes are present. There is extensive chondrocalcinosis.    Evaluation of the medial meniscus and chondral defects is limited without intra-articular contrast.    LATERAL COMPARTMENT    Osteoarthritic Changes:  Joint spaces is maintained. There is extensive chondrocalcinosis.    Evaluation of the medial meniscus and chondral defects is limited without intra-articular contrast.    PATELLOFEMORAL COMPARTMENT    Osteoarthritic Changes:  Joint spaces relatively maintained although tiny osteophytes are present. Extensive chondrocalcinosis is noted.    Alignment:  Maintained.    JOINT CAPSULE    Effusion:  A borderline small joint effusion is present.    PERIARTICULAR SOFT TISSUES    Popliteal Cyst:  A small Baker's cyst is present.    Musculature:  Maintained.    Neurovascular:  Within normal limits for CT.    Subcutaneous Tissues:  Moderate prepatellar edema is noted. Slight prepatellar dystrophic calcifications are present.    LEFT KNEE:  OSSEOUS STRUCTURES    Acute/Chronic Fractures:  None.    LIGAMENTS AND TENDONS    Cruciate Ligaments:  Intact within limits of CT.    Extensor Tendon Complex:  Intact within limits of CT. Suprapatellar enthesophytes are noted.    Medial Collateral Ligament:  Intact within limits of CT.    Posterolateral Corner Structures:  Intact within limits of CT. Popliteal tendon calcifications are present.    MEDIAL COMPARTMENT    ArticularSurfaces:  Joint space is relatively maintained although small marginal osteophytes are present. Extensive chondrocalcinosis is noted.    Evaluation of the medial meniscus and chondral defects is limited without intra-articular contrast.    LATERAL COMPARTMENT    Articular Surfaces:  Joint spaces maintained. Extensive chondrocalcinosis is noted.    Evaluation of the medial meniscus and chondral defects is limited without intra-articular contrast.    PATELLOFEMORAL COMPARTMENT    Articular Surfaces:  Tiny marginal osteophytes are present. Extensive chondrocalcinosis is noted.    Alignment:  Maintained.    JOINT CAPSULE    Effusion:  None.    PERIARTICULAR SOFT TISSUES    Popliteal Cyst:  Very small Baker's cyst is noted.    Musculature:  Maintained.    Neurovascular:  Within normal limits for CT.    Subcutaneous Tissues:  Unremarkable.      IMPRESSION:  1. Mildly displaced and depressed Schatzker type I fracture of the right knee lateral tibial plateau is confirmed on this exam.  2. No fractures of the left knee.  3. Mild osteoarthritis of both knees with extensive chondrocalcinosis.    < end of copied text >    Imaging  Reviewed:  YES    Consultant(s) Notes Reviewed:   YES      Plan of care was discussed with patient and /or primary care giver; all questions and concerns were addressed

## 2021-01-29 NOTE — DISCHARGE NOTE PROVIDER - NSDCCPCAREPLAN_GEN_ALL_CORE_FT
PRINCIPAL DISCHARGE DIAGNOSIS  Diagnosis: PNA (pneumonia)  Assessment and Plan of Treatment: You Chest x-ray found that you have Pneumonia. Please continue to take your medications as prescribed. Please follow up with your primary care physician in a week.      SECONDARY DISCHARGE DIAGNOSES  Diagnosis: Smoking addiction  Assessment and Plan of Treatment: Please stop smoking as this can contribute to your morbidity and mortality. Please continue to use Nicotine Patches. Please consider a smoking cessation suppport group. Please follow up with your primary care physician.   [facility] Smoking Cessation Program  [phone]  email: [email or web address]    Diagnosis: Benzodiazepine withdrawal  Assessment and Plan of Treatment: You are currently treated with Xanax for your anxiety by your primary care provider. You agreed to go to your provider after discharge for further management of your Anxiety. Please follow up with your Primary Care Physician in 3 days.    Diagnosis: Seizure  Assessment and Plan of Treatment: Your Electroencephalogram (EEG) was negative for seizure. Please continue to take your medications as prescribed by your provider. Please follow up with your primary care physician in a week.    Diagnosis: Moderate protein-calorie malnutrition  Assessment and Plan of Treatment: You have Moderate protein-calorie malnutrition from decreased oral intake. Please continue to eat a health balance diet. Please follow up with your primary care physician in a week.    Diagnosis: Pancytopenia  Assessment and Plan of Treatment: Your were found to have low blood count and was evaluated by Hematology Oncology. [facility] and Hemato[facility]Oncology in a week for repeat blood work in a week.    Diagnosis: Alcohol withdrawal  Assessment and Plan of Treatment: You have a history of Alcohol Abuse. Please stop drinking alcohol as this can increase your chances of Mobidity and Mortality. Please consider joining Alcohol Anonymous or a support group for further help with Alcohol cessation. Please follow up with your primary care physician on a week.     PRINCIPAL DISCHARGE DIAGNOSIS  Diagnosis: PNA (pneumonia)  Assessment and Plan of Treatment: You Chest x-ray found that you have Pneumonia. Please continue to take your medications as prescribed. Please follow up with your primary care physician in a week.      SECONDARY DISCHARGE DIAGNOSES  Diagnosis: Fracture  Assessment and Plan of Treatment: You were found to have a right knee fracture. Orthopedic surgeon recommending conservative management. Please continue to:  --wear your right knee immbolizer at all times with appropriate assitive device.  -DO NOT BEAR WEIGHT on your right knee.  -Follow up with Dr. Fournier in ONE WEEK at [phone]      Diagnosis: Smoking addiction  Assessment and Plan of Treatment: Please stop smoking as this can contribute to your morbidity and mortality. Please continue to use Nicotine Patches. Please consider a smoking cessation suppport group. Please follow up with your primary care physician.   [facility] Smoking Cessation Program  [phone]  email: [email or web address]    Diagnosis: Benzodiazepine withdrawal  Assessment and Plan of Treatment: You are currently treated with Xanax for your anxiety by your primary care provider. You agreed to go to your provider after discharge for further management of your Anxiety. Please follow up with your Primary Care Physician in 3 days.    Diagnosis: Seizure  Assessment and Plan of Treatment: Your Electroencephalogram (EEG) was negative for seizure. Please continue to take your medications as prescribed by your provider. Please follow up with your primary care physician in a week.    Diagnosis: Moderate protein-calorie malnutrition  Assessment and Plan of Treatment: You have Moderate protein-calorie malnutrition from decreased oral intake. Please continue to eat a health balance diet. Please follow up with your primary care physician in a week.    Diagnosis: Pancytopenia  Assessment and Plan of Treatment: Your were found to have low blood count and was evaluated by Hematology Oncology. Please [facility] and HematoogyOncology in a week for repeat blood work in a week.    Diagnosis: Alcohol withdrawal  Assessment and Plan of Treatment: You have a history of Alcohol Abuse. Please stop drinking alcohol as this can increase your chances of Mobidity and Mortality. Please consider joining Alcohol Anonymous or a support group for further help with Alcohol cessation. Please follow up with your primary care physician on a week.     PRINCIPAL DISCHARGE DIAGNOSIS  Diagnosis: PNA (pneumonia)  Assessment and Plan of Treatment: You Chest x-ray found that you have Pneumonia. Please continue to take your medications as prescribed. Please follow up with your primary care physician in a week.      SECONDARY DISCHARGE DIAGNOSES  Diagnosis: Fracture  Assessment and Plan of Treatment: You were found to have a right knee fracture. Orthopedic surgeon recommending conservative management. Please continue to:  --wear your right knee immbolizer at all times with appropriate assitive device.  -DO NOT BEAR WEIGHT on your right knee.  -Follow up with Dr. Fournier in ONE WEEK at [phone]      Diagnosis: Fatty infiltration of liver  Assessment and Plan of Treatment: Your ultra sound of liver showed mild fatty infiltration of liver. Follow up with you PCP and follow up Alcohol anonymous program    Diagnosis: Smoking addiction  Assessment and Plan of Treatment: Please stop smoking as this can contribute to your morbidity and mortality. Please continue to use Nicotine Patches. Please consider a smoking cessation suppport group. Please follow up with your primary care physician.   [facility] Smoking Cessation Program  [phone]  email: [email or web address]    Diagnosis: Seizure  Assessment and Plan of Treatment: Your Electroencephalogram (EEG) was negative for seizure. Please continue to take your medications as prescribed by your provider. Please follow up with your primary care physician in a week.    Diagnosis: Pancytopenia  Assessment and Plan of Treatment: Your were found to have low blood count and was evaluated by Hematology Oncology. Please follow up with your [facility] care physician and HematoogyOncology in a week for repeat blood work in a week.    Diagnosis: Moderate protein-calorie malnutrition  Assessment and Plan of Treatment: You have Moderate protein-calorie malnutrition from decreased oral intake. Please continue to eat a health balance diet. Please follow up with your primary care physician in a week.    Diagnosis: Alcohol withdrawal  Assessment and Plan of Treatment: You have a history of Alcohol Abuse. Please stop drinking alcohol as this can increase your chances of Mobidity and Mortality. Please consider joining Alcohol Anonymous or a support group for further help with Alcohol cessation. Please follow up with your primary care physician on a week.    Diagnosis: Benzodiazepine withdrawal  Assessment and Plan of Treatment: You are currently treated with Xanax for your anxiety by your primary care provider. You agreed to go to your provider after discharge for further management of your Anxiety. Please follow up with your Primary Care Physician in 3 days.

## 2021-01-29 NOTE — PROGRESS NOTE ADULT - PROBLEM SELECTOR PLAN 3
-Benzodiazepine and ETOH abuse  -c/w Librium 25mg PO every 6 hrs, as recommended by Psych  - home dose Xanax held, patient will get refill from outpatient provider  - Patient cleared for capacity with Psych for safe discharge once medically cleared.  - Psych Dr. Stewart
-afebrile, no leukocytosis  -chest x-ray- Patchy right lower lobe infiltrate.  -speech and swallow evaluation  -c/w cefepime, allergy to PNC
-Benzodiazepine and ETOH abuse  -started on Librium 25mg PO every 6 hrs, as recommended by Psych  - home dose Xanax held, patient will get refill from outpatient provider  - Patient cleared for capacity with Psych for safe discharge once medically cleared.  - Psych Dr. Stewart

## 2021-01-29 NOTE — PROGRESS NOTE ADULT - PROBLEM SELECTOR PLAN 6
-possible from malnutrition, ETOH, liver cirrhosis, splenomegaly  -f/u abdominal US  -NPO after midnight  -HemOnc Dr. Padgett -possible from malnutrition, ETOH, liver cirrhosis, splenomegaly  -abdominal US limited showing - Mild fatty infiltration of liver with mild nodularity and slight enlargement measuring 17 cm  -f/u abdominal US for splenomegaly  -NPO after midnight  -HemOnc Dr. Padgett -possible from malnutrition, ETOH, liver cirrhosis, splenomegaly  -abdominal US limited showing - Mild fatty infiltration of liver with mild nodularity and slight enlargement measuring 17 cm  -Patient to follow outpatient for with Dr. Padgett  -[facility] Dr. Padgett

## 2021-01-29 NOTE — DISCHARGE NOTE PROVIDER - PROVIDER TOKENS
PROVIDER:[TOKEN:[[id number]],FOLLOWUP:[1-3 days]] PROVIDER:[TOKEN:[[id number]],FOLLOWUP:[1-3 days]],FREE:[LAST:[Bessar],FIRST:[Sundeep],PHONE:[phone]],FAX:[(   [phone]    -],ADDRESS:[[address] [place] [address]],FOLLOWUP:[1 week]]

## 2021-01-29 NOTE — CONSULT NOTE ADULT - SUBJECTIVE AND OBJECTIVE BOX
MIKE FUENTES  [id number]    Orthopedic Consult: Right lateral tibial plateau fracture     Orthopedic Diagnosis:  Right lateral tibial plateau fracture     MIKE FUENTES61yFemale  HPI:  Patient is a 60 y/o F, from home, independent ambulator with a PMH of Seizures, anxiety, EtOH abuse who presents c/o right knee pain s/p seizure 3 days ago. Patient states that while in her doctors office she had a seizure, cannot remember or recall the event. Patient states the pain is localized to the right knee, non-radiating worsened with movement of the right knee. Pt denies Chest pain, SOB, dyspnea, paresthesias, N/V/D.    Ambulation: Independent    PAST MEDICAL & SURGICAL HISTORY:  Anxiety    ETOH abuse    Right knee pain    RA (rheumatoid arthritis)    Hepatitis C    S/P         FAMILY HISTORY:      Social History:  10 cigarets a day since 40 yrs, smoking,   drinking 6-8  bear every other day for 25-30 yr  last drink was last night 6 bears (2021 16:15)      Allergies    penicillin (Rash)    Intolerances        Home Medications:  meloxicam 15 mg oral tablet: 1 tab(s) orally once a day (2021 22:25)  Norco 5 mg-325 mg oral tablet: 1 tab(s) orally once a day, As Needed (2021 22:25)  Xanax 1 mg oral tablet: 1 tab(s) orally once a day (2021 22:25)      Vital Signs Last 24 Hrs  T(C): 37.1 (2021 14:44), Max: 37.1 (2021 14:44)  T(F): 98.7 (2021 14:44), Max: 98.7 (2021 14:44)  HR: 98 (2021 14:44) (90 - 100)  BP: 107/59 (2021 14:44) (107/55 - 107/59)  BP(mean): --  RR: 16 (2021 14:44) (16 - 17)  SpO2: 100% (2021 14:44) (94% - 100%)  I&O's Summary      Physical Exam:  General: Alert and oriented, NAD, resting comfortably  Musculoskeletal:  Right knee: Skin warm and pink. No ecchymosis. No open wounds or lesions. TTP over lateral tibial plateau/joint line. Knee ROM limited 2/2 pain. Compartments soft and compressible. No valgus/varus instability  Lower extremities: Calves soft and NTTP b/l. SILT. NVI. (+)EHL/FHL/ADF/APF intact bilaterally    Labs:                        10.1   2.40  )-----------( 70       ( 2021 06:40 )             30.8         139  |  106  |  6<L>  ----------------------------<  103<H>  4.3   |  28  |  0.78    Ca    8.1<L>      2021 06:40  Phos  3.5       Mg     1.7         TPro  5.8<L>  /  Alb  2.4<L>  /  TBili  1.6<H>  /  DBili  x   /  AST  31  /  ALT  15  /  AlkPhos  94          Radiology:  < from: CT Knee No Cont, Bilateral (21 @ 11:23) >    EXAM:  CT 3D RECONSTRUCT [facility]                          EXAM:  CT KNEE ONLY BI                            PROCEDURE DATE:  2021          INTERPRETATION:  CT KNEE BILATERAL, CT 3D RECONSTRUCTION WO WORKSTATION    HISTORY: Fell 3 days ago with bilateral knee pain.    TECHNIQUE: Contiguous axial imaging was performed through the bilateral knees. Coronal and sagittal reformatting was utilized. 3-D reformatting was also performed and the findings are compatible with the planar imaging.    COMPARISON:  Right knee x-rays dated 2021.    FINDINGS:    RIGHT KNEE:  OSSEOUS STRUCTURES    Acute/Chronic Fractures:  Mildly displaced and depressed Schatzker type I lateral tibial plateau fracture is confirmed on this exam. Up to 0.3cm gap is seen along the anterior margin of the fracture site. There is approximately 0.2 cm of depression.    LIGAMENTS AND TENDONS    Cruciate Ligaments:  Intact within limits of CT.    Extensor Tendon Complex:  Intact within limits of CT. Suprapatellar enthesophytes are noted.    Medial Collateral Ligament:  Intact within limits of CT.    Posterolateral Corner Structures:  Intact within limits of CT. Popliteal tendon and lateral collateral ligament calcifications are noted at the femoral attachment.    MEDIAL COMPARTMENT    Osteoarthritic Changes:  Joint space is relatively maintained although small marginal osteophytes are present. There is extensive chondrocalcinosis.    Evaluation of the medial meniscus and chondral defects is limited without intra-articular contrast.    LATERAL COMPARTMENT    Osteoarthritic Changes:  Joint spaces is maintained. There is extensive chondrocalcinosis.    Evaluation of the medial meniscus and chondral defects is limited without intra-articular contrast.    PATELLOFEMORAL COMPARTMENT    Osteoarthritic Changes:  Joint spaces relatively maintained although tiny osteophytes are present. Extensive chondrocalcinosis is noted.    Alignment:  Maintained.    JOINT CAPSULE    Effusion:  A borderline small joint effusion is present.    PERIARTICULAR SOFT TISSUES    Popliteal Cyst:  A small Baker's cyst is present.    Musculature:  Maintained.    Neurovascular:  Within normal limits for CT.    Subcutaneous Tissues:  Moderate prepatellar edema is noted. Slight prepatellar dystrophic calcifications are present.    LEFT KNEE:  OSSEOUS STRUCTURES    Acute/Chronic Fractures:  None.    LIGAMENTS AND TENDONS    Cruciate Ligaments:  Intact within limits of CT.    Extensor Tendon Complex:  Intact within limits of CT. Suprapatellar enthesophytes are noted.    Medial Collateral Ligament:  Intact within limits of CT.    Posterolateral Corner Structures:  Intact within limits of CT. Popliteal tendon calcifications are present.    MEDIAL COMPARTMENT    ArticularSurfaces:  Joint space is relatively maintained although small marginal osteophytes are present. Extensive chondrocalcinosis is noted.    Evaluation of the medial meniscus and chondral defects is limited without intra-articular contrast.    LATERAL COMPARTMENT    Articular Surfaces:  Joint spaces maintained. Extensive chondrocalcinosis is noted.    Evaluation of the medial meniscus and chondral defects is limited without intra-articular contrast.    PATELLOFEMORAL COMPARTMENT    Articular Surfaces:  Tiny marginal osteophytes are present. Extensive chondrocalcinosis is noted.    Alignment:  Maintained.    JOINT CAPSULE    Effusion:  None.    PERIARTICULAR SOFT TISSUES    Popliteal Cyst:  Very small Baker's cyst is noted.    Musculature:  Maintained.    Neurovascular:  Within normal limits for CT.    Subcutaneous Tissues:  Unremarkable.      IMPRESSION:  1. Mildly displaced and depressed Schatzker type I fracture of the right knee lateral tibial plateau is confirmed on this exam.  2. No fractures of the left knee.  3. Mild osteoarthritis of both knees with extensive chondrocalcinosis.    Impression: Patient is a 61yFemale with Right lateral tibial plateau fracture   Plan:  - Recommendation: [ X ] Conservative management [  ] Surgical intervention  - Pain management  - DVT prophylaxis  - Daily PT - NWB to RLE with knee in immobilizer at all times with appropriate assistive device   - Knee immobilizer applied   - Patient is orthopedically stable for discharge  - Patient is to Follow up with Dr Tenorio in ONE WEEK at [phone]

## 2021-01-29 NOTE — PROGRESS NOTE ADULT - PROBLEM SELECTOR PLAN 4
-CRISTO 7  - ativan standing and PRN, held  -c/w thiamin, MV, FA  -enhance supervision, high risk for falls
-CRISTO 7  - ativan standing and PRN, held  -c/w thiamin, MV, FA  -enhance supervision, high risk for falls
-CIWA 4  -c/w ativan standing and PRN  -c/w thiamin, MV, FA

## 2021-01-29 NOTE — PROGRESS NOTE ADULT - PROBLEM SELECTOR PLAN 5
-c/w lidocaine patch  -Knee x-ray possible right non displaced fracture, f/u CT b/l knee as recommended by radiology -c/w lidocaine patch  -Knee x-ray possible right non displaced fracture  - CT b/l knee - Mildly displaced and depressed Schatzker type I fracture of the right knee lateral tibial plateau, Left knee no fracture  -Ortho consulting spoke with PA -c/w lidocaine patch  -Knee x-ray possible right non displaced fracture  - CT b/l knee - Mildly displaced and depressed Schatzker type I fracture of the right knee lateral tibial plateau, Left knee no fracture  -Ortho recommending conservative management.  -Daily PT- NWB to RLE with knee in immbolizer at all times with appropriate assitive device.  -Patient is orthopedically stable for discharge.  -Follow up with Dr. Fournier in ONE WEEK at [phone]  -Ortho Following

## 2021-01-29 NOTE — PROGRESS NOTE ADULT - SUBJECTIVE AND OBJECTIVE BOX
lethargic  arousable  no fever or sob  MEDICATIONS  (STANDING):  cefepime   IVPB 1000 milliGRAM(s) IV Intermittent every 12 hours  chlordiazePOXIDE 25 milliGRAM(s) Oral every 6 hours  cyanocobalamin 1000 MICROGram(s) Oral daily  enoxaparin Injectable 40 milliGRAM(s) SubCutaneous daily  folic acid 1 milliGRAM(s) Oral daily  lidocaine   Patch 2 Patch Transdermal daily  metroNIDAZOLE  IVPB 500 milliGRAM(s) IV Intermittent every 8 hours  multivitamin 1 Tablet(s) Oral daily  nicotine -  14 mG/24Hr(s) Patch 1 patch Transdermal daily  ondansetron Injectable 4 milliGRAM(s) IV Push two times a day  pantoprazole    Tablet 40 milliGRAM(s) Oral before breakfast  thiamine IVPB 500 milliGRAM(s) IV Intermittent three times a day    MEDICATIONS  (PRN):  acetaminophen  Suppository .. 650 milliGRAM(s) Rectal every 6 hours PRN Temp greater or equal to 38C (100.4F), Mild Pain (1 - 3)      Allergies    penicillin (Rash)    Intolerances        Vital Signs Last 24 Hrs  T(C): 36.9 (29 Jan 2021 05:17), Max: 36.9 (29 Jan 2021 05:17)  T(F): 98.4 (29 Jan 2021 05:17), Max: 98.4 (29 Jan 2021 05:17)  HR: 90 (29 Jan 2021 05:17) (90 - 100)  BP: 107/59 (29 Jan 2021 05:17) (107/55 - 107/59)  BP(mean): --  RR: 16 (29 Jan 2021 05:17) (16 - 17)  SpO2: 94% (29 Jan 2021 05:17) (94% - 100%)    PHYSICAL EXAM  General: adult in NAD  HEENT: clear oropharynx, anicteric sclera, pink conjunctiva  Neck: supple  CV: normal S1/S2 with no murmur rubs or gallops  Lungs: positive air movement b/l ant lungs,clear to auscultation, no wheezes, no rales  Abdomen: soft non-tender non-distended, no hepatosplenomegaly  Ext: no clubbing cyanosis or edema  Skin: no rashes and no petechiae  Neuro: alert and oriented X 4, no focal deficits  LABS:                          10.1   2.40  )-----------( x        ( 29 Jan 2021 06:40 )             30.8         Mean Cell Volume : 98.7 fl  Mean Cell Hemoglobin : 32.4 pg  Mean Cell Hemoglobin Concentration : 32.8 gm/dL  Auto Neutrophil # : x  Auto Lymphocyte # : x  Auto Monocyte # : x  Auto Eosinophil # : x  Auto Basophil # : x  Auto Neutrophil % : x  Auto Lymphocyte % : x  Auto Monocyte % : x  Auto Eosinophil % : x  Auto Basophil % : x    Serial CBC  Hematocrit 30.8  Hemoglobin 10.1  Plat --  RBC 3.12  WBC 2.40  Serial CBC  Hematocrit 28.7  Hemoglobin 9.5  Plat 55  RBC 2.93  WBC 2.26  Serial CBC  Hematocrit 28.5  Hemoglobin 9.5  Plat 62  RBC 2.90  WBC 2.64  Serial CBC  Hematocrit 28.1  Hemoglobin 9.1  Plat 66  RBC 2.87  WBC 2.93  Serial CBC  Hematocrit 35.4  Hemoglobin 11.7  Plat 81  RBC 3.67  WBC 2.79    01-29    139  |  106  |  6<L>  ----------------------------<  103<H>  4.3   |  28  |  0.78    Ca    8.1<L>      29 Jan 2021 06:40  Phos  3.5     01-29  Mg     1.7     01-29    TPro  5.8<L>  /  Alb  2.4<L>  /  TBili  1.6<H>  /  DBili  x   /  AST  31  /  ALT  15  /  AlkPhos  94  01-28          Ferritin, Serum: 109 ng/mL (01-28 @ 10:39)  Folate, Serum: 13.8 ng/mL (01-28 @ 10:39)  Vitamin B12, Serum: 772 pg/mL (01-28 @ 10:39)  Iron - Total Binding Capacity.: 284 ug/dL (01-28 @ 07:26)  Iron - Total Binding Capacity.: 296 ug/dL (01-27 @ 06:23)  Ferritin, Serum: 66 ng/mL (01-27 @ 00:46)  Folate, Serum: 12.3 ng/mL (01-27 @ 00:46)  Vitamin B12, Serum: 793 pg/mL (01-27 @ 00:46)            BLOOD SMEAR INTERPRETATION:       RADIOLOGY & ADDITIONAL STUDIES:

## 2021-01-29 NOTE — PROGRESS NOTE ADULT - PROBLEM SELECTOR PLAN 1
-EEG no epileptiform or seizure seen  -no need for seizure RX, recommended by Neurology, primary seizure  Neurology Dr. Fang
-EEG no epileptiform or seizure seen  -no need for seizure RX, recommended by Neurology, primary seizure  Neurology Dr. Fang
s/p keppra loading x1 in ED  -c/w ativan   -restarted on Ativan 1mg PO PRN for anxiety, as this is patient's home medication  -EEG no epileptiform or seizure seen  Neurology Dr. Fang

## 2021-01-29 NOTE — PROGRESS NOTE ADULT - SUBJECTIVE AND OBJECTIVE BOX
HPI:  Pt is 61 year old female  hx of seizure( not on any meds) HCV ?cirrhosis, newly diagnosed COPD not on inhalers, chronic migraines and anxiety on Xanax admitted with new onset witnessed seizure.  hx of seizure, not on any meds. Seizure witnessed with generalized tonic clonic movements 3-4 min and had another episode in ed. as per  she had 3-4 seizure in past but walked out AMA during last admission. Hx somewhat limited due to patient's mental status- medication effect vs postictal state. Alcohol consumption unclear, states she drinks every day 4-6 beers with last beer last night 1/26 but difficult to obtain further information about chronic consumption.  Of note ran out of Xanax 2 days ago. As per  collateral information obtained from : patient has been drinking heavily for many years at least a six pack of beer a day or every other day. As per  pt has been space out on and off for last 6-8 months and has been having severe panic attacks. Pt denies any drug abuse or any other acute finding. Pt was out of her xanax for 1 week.  (26 Jan 2021 16:15)      Patient is a 61y old  Female who presents with a chief complaint of seizure (29 Jan 2021 12:09)      INTERVAL HPI/OVERNIGHT EVENTS:  T(C): 36.9 (01-29-21 @ 05:17), Max: 36.9 (01-29-21 @ 05:17)  HR: 90 (01-29-21 @ 05:17) (90 - 100)  BP: 107/59 (01-29-21 @ 05:17) (107/55 - 107/59)  RR: 16 (01-29-21 @ 05:17) (16 - 17)  SpO2: 94% (01-29-21 @ 05:17) (94% - 100%)  Wt(kg): --  I&O's Summary      REVIEW OF SYSTEMS: denies fever, chills, SOB, palpitations, chest pain, abdominal pain, nausea, vomitting, diarrhea, constipation, dizziness    MEDICATIONS  (STANDING):  cefepime   IVPB 1000 milliGRAM(s) IV Intermittent every 12 hours  chlordiazePOXIDE 25 milliGRAM(s) Oral every 6 hours  cyanocobalamin 1000 MICROGram(s) Oral daily  enoxaparin Injectable 40 milliGRAM(s) SubCutaneous daily  folic acid 1 milliGRAM(s) Oral daily  lidocaine   Patch 2 Patch Transdermal daily  metroNIDAZOLE  IVPB 500 milliGRAM(s) IV Intermittent every 8 hours  multivitamin 1 Tablet(s) Oral daily  nicotine -  14 mG/24Hr(s) Patch 1 patch Transdermal daily  ondansetron Injectable 4 milliGRAM(s) IV Push two times a day  pantoprazole    Tablet 40 milliGRAM(s) Oral before breakfast    MEDICATIONS  (PRN):  acetaminophen   Tablet .. 650 milliGRAM(s) Oral every 6 hours PRN Moderate Pain (4 - 6)      PHYSICAL EXAM:  GENERAL: NAD, well-groomed, well-developed  HEAD:  Atraumatic, Normocephalic  EYES: EOMI, PERRLA, conjunctiva and sclera clear  ENMT: No tonsillar erythema, exudates, or enlargement; Moist mucous membranes, Good dentition, No lesions  NECK: Supple, No JVD, Normal thyroid  NERVOUS SYSTEM:  Alert & Oriented X3, Good concentration; Motor Strength 5/5 B/L upper and lower extremities; DTRs 2+ intact and symmetric  CHEST/LUNG: Clear to percussion bilaterally; No rales, rhonchi, wheezing, or rubs  HEART: Regular rate and rhythm; No murmurs, rubs, or gallops  ABDOMEN: Soft, Nontender, Nondistended; Bowel sounds present  EXTREMITIES:  2+ Peripheral Pulses, No clubbing, cyanosis, or edema  LYMPH: No lymphadenopathy noted  SKIN: No rashes or lesions  LABS:                        10.1   2.40  )-----------( 70       ( 29 Jan 2021 06:40 )             30.8     01-29    139  |  106  |  6<L>  ----------------------------<  103<H>  4.3   |  28  |  0.78    Ca    8.1<L>      29 Jan 2021 06:40  Phos  3.5     01-29  Mg     1.7     01-29    TPro  5.8<L>  /  Alb  2.4<L>  /  TBili  1.6<H>  /  DBili  x   /  AST  31  /  ALT  15  /  AlkPhos  94  01-28        CAPILLARY BLOOD GLUCOSE

## 2021-01-30 VITALS
SYSTOLIC BLOOD PRESSURE: 118 MMHG | RESPIRATION RATE: 17 BRPM | HEART RATE: 95 BPM | TEMPERATURE: 98 F | OXYGEN SATURATION: 100 % | DIASTOLIC BLOOD PRESSURE: 85 MMHG

## 2021-01-30 LAB
ALBUMIN SERPL ELPH-MCNC: 2.5 G/DL — LOW (ref 3.5–5)
ALP SERPL-CCNC: 97 U/L — SIGNIFICANT CHANGE UP (ref 40–120)
ALT FLD-CCNC: 14 U/L DA — SIGNIFICANT CHANGE UP (ref 10–60)
ANION GAP SERPL CALC-SCNC: 6 MMOL/L — SIGNIFICANT CHANGE UP (ref 5–17)
AST SERPL-CCNC: 33 U/L — SIGNIFICANT CHANGE UP (ref 10–40)
BILIRUB SERPL-MCNC: 0.8 MG/DL — SIGNIFICANT CHANGE UP (ref 0.2–1.2)
BUN SERPL-MCNC: 6 MG/DL — LOW (ref 7–18)
CALCIUM SERPL-MCNC: 8.1 MG/DL — LOW (ref 8.4–10.5)
CHLORIDE SERPL-SCNC: 108 MMOL/L — SIGNIFICANT CHANGE UP (ref 96–108)
CO2 SERPL-SCNC: 25 MMOL/L — SIGNIFICANT CHANGE UP (ref 22–31)
CREAT SERPL-MCNC: 0.81 MG/DL — SIGNIFICANT CHANGE UP (ref 0.5–1.3)
GLUCOSE SERPL-MCNC: 99 MG/DL — SIGNIFICANT CHANGE UP (ref 70–99)
HCT VFR BLD CALC: 28.9 % — LOW (ref 34.5–45)
HGB BLD-MCNC: 9.5 G/DL — LOW (ref 11.5–15.5)
MAGNESIUM SERPL-MCNC: 1.9 MG/DL — SIGNIFICANT CHANGE UP (ref 1.6–2.6)
MCHC RBC-ENTMCNC: 32.8 PG — SIGNIFICANT CHANGE UP (ref 27–34)
MCHC RBC-ENTMCNC: 32.9 GM/DL — SIGNIFICANT CHANGE UP (ref 32–36)
MCV RBC AUTO: 99.7 FL — SIGNIFICANT CHANGE UP (ref 80–100)
NRBC # BLD: 0 /100 WBCS — SIGNIFICANT CHANGE UP (ref 0–0)
PHOSPHATE SERPL-MCNC: 3.7 MG/DL — SIGNIFICANT CHANGE UP (ref 2.5–4.5)
PLATELET # BLD AUTO: 70 K/UL — LOW (ref 150–400)
POTASSIUM SERPL-MCNC: 4.1 MMOL/L — SIGNIFICANT CHANGE UP (ref 3.5–5.3)
POTASSIUM SERPL-SCNC: 4.1 MMOL/L — SIGNIFICANT CHANGE UP (ref 3.5–5.3)
PROT SERPL-MCNC: 5.9 G/DL — LOW (ref 6–8.3)
RBC # BLD: 2.9 M/UL — LOW (ref 3.8–5.2)
RBC # FLD: 13.7 % — SIGNIFICANT CHANGE UP (ref 10.3–14.5)
SODIUM SERPL-SCNC: 139 MMOL/L — SIGNIFICANT CHANGE UP (ref 135–145)
WBC # BLD: 1.71 K/UL — LOW (ref 3.8–10.5)
WBC # FLD AUTO: 1.71 K/UL — LOW (ref 3.8–10.5)

## 2021-01-30 PROCEDURE — 95957 EEG DIGITAL ANALYSIS: CPT

## 2021-01-30 PROCEDURE — 80053 COMPREHEN METABOLIC PANEL: CPT

## 2021-01-30 PROCEDURE — 83036 HEMOGLOBIN GLYCOSYLATED A1C: CPT

## 2021-01-30 PROCEDURE — 84484 ASSAY OF TROPONIN QUANT: CPT

## 2021-01-30 PROCEDURE — 73700 CT LOWER EXTREMITY W/O DYE: CPT

## 2021-01-30 PROCEDURE — 85730 THROMBOPLASTIN TIME PARTIAL: CPT

## 2021-01-30 PROCEDURE — 82607 VITAMIN B-12: CPT

## 2021-01-30 PROCEDURE — 73562 X-RAY EXAM OF KNEE 3: CPT

## 2021-01-30 PROCEDURE — 97116 GAIT TRAINING THERAPY: CPT

## 2021-01-30 PROCEDURE — 82009 KETONE BODYS QUAL: CPT

## 2021-01-30 PROCEDURE — 85610 PROTHROMBIN TIME: CPT

## 2021-01-30 PROCEDURE — 84443 ASSAY THYROID STIM HORMONE: CPT

## 2021-01-30 PROCEDURE — 83880 ASSAY OF NATRIURETIC PEPTIDE: CPT

## 2021-01-30 PROCEDURE — 92610 EVALUATE SWALLOWING FUNCTION: CPT

## 2021-01-30 PROCEDURE — 80048 BASIC METABOLIC PNL TOTAL CA: CPT

## 2021-01-30 PROCEDURE — 84550 ASSAY OF BLOOD/URIC ACID: CPT

## 2021-01-30 PROCEDURE — 84100 ASSAY OF PHOSPHORUS: CPT

## 2021-01-30 PROCEDURE — 83550 IRON BINDING TEST: CPT

## 2021-01-30 PROCEDURE — 83540 ASSAY OF IRON: CPT

## 2021-01-30 PROCEDURE — 96375 TX/PRO/DX INJ NEW DRUG ADDON: CPT

## 2021-01-30 PROCEDURE — 76705 ECHO EXAM OF ABDOMEN: CPT

## 2021-01-30 PROCEDURE — 93005 ELECTROCARDIOGRAM TRACING: CPT

## 2021-01-30 PROCEDURE — 82746 ASSAY OF FOLIC ACID SERUM: CPT

## 2021-01-30 PROCEDURE — 85027 COMPLETE CBC AUTOMATED: CPT

## 2021-01-30 PROCEDURE — 83605 ASSAY OF LACTIC ACID: CPT

## 2021-01-30 PROCEDURE — 80307 DRUG TEST PRSMV CHEM ANLYZR: CPT

## 2021-01-30 PROCEDURE — 96374 THER/PROPH/DIAG INJ IV PUSH: CPT

## 2021-01-30 PROCEDURE — 76376 3D RENDER W/INTRP POSTPROCES: CPT

## 2021-01-30 PROCEDURE — 87086 URINE CULTURE/COLONY COUNT: CPT

## 2021-01-30 PROCEDURE — 82962 GLUCOSE BLOOD TEST: CPT

## 2021-01-30 PROCEDURE — 83735 ASSAY OF MAGNESIUM: CPT

## 2021-01-30 PROCEDURE — 87040 BLOOD CULTURE FOR BACTERIA: CPT

## 2021-01-30 PROCEDURE — 82140 ASSAY OF AMMONIA: CPT

## 2021-01-30 PROCEDURE — 82550 ASSAY OF CK (CPK): CPT

## 2021-01-30 PROCEDURE — 71045 X-RAY EXAM CHEST 1 VIEW: CPT

## 2021-01-30 PROCEDURE — 99291 CRITICAL CARE FIRST HOUR: CPT

## 2021-01-30 PROCEDURE — 70450 CT HEAD/BRAIN W/O DYE: CPT

## 2021-01-30 PROCEDURE — 76705 ECHO EXAM OF ABDOMEN: CPT | Mod: 26

## 2021-01-30 PROCEDURE — 85025 COMPLETE CBC W/AUTO DIFF WBC: CPT

## 2021-01-30 PROCEDURE — 81003 URINALYSIS AUTO W/O SCOPE: CPT

## 2021-01-30 PROCEDURE — 86803 HEPATITIS C AB TEST: CPT

## 2021-01-30 PROCEDURE — 82728 ASSAY OF FERRITIN: CPT

## 2021-01-30 PROCEDURE — 87635 SARS-COV-2 COVID-19 AMP PRB: CPT

## 2021-01-30 PROCEDURE — 95819 EEG AWAKE AND ASLEEP: CPT

## 2021-01-30 PROCEDURE — 97150 GROUP THERAPEUTIC PROCEDURES: CPT

## 2021-01-30 PROCEDURE — 36415 COLL VENOUS BLD VENIPUNCTURE: CPT

## 2021-01-30 PROCEDURE — 86769 SARS-COV-2 COVID-19 ANTIBODY: CPT

## 2021-01-30 PROCEDURE — 87521 HEPATITIS C PROBE&RVRS TRNSC: CPT

## 2021-01-30 PROCEDURE — U0005: CPT

## 2021-01-30 PROCEDURE — 83615 LACTATE (LD) (LDH) ENZYME: CPT

## 2021-01-30 PROCEDURE — 80076 HEPATIC FUNCTION PANEL: CPT

## 2021-01-30 PROCEDURE — 80061 LIPID PANEL: CPT

## 2021-01-30 RX ORDER — LIDOCAINE 4 G/100G
1 CREAM TOPICAL
Qty: 30 | Refills: 0
Start: 2021-01-30 | End: 2021-02-28

## 2021-01-30 RX ORDER — PREGABALIN 225 MG/1
1 CAPSULE ORAL
Qty: 0 | Refills: 0 | DISCHARGE
Start: 2021-01-30

## 2021-01-30 RX ORDER — PANTOPRAZOLE SODIUM 20 MG/1
1 TABLET, DELAYED RELEASE ORAL
Qty: 30 | Refills: 0
Start: 2021-01-30 | End: 2021-02-28

## 2021-01-30 RX ORDER — METRONIDAZOLE 500 MG
1 TABLET ORAL
Qty: 13 | Refills: 0
Start: 2021-01-30 | End: 2021-02-02

## 2021-01-30 RX ORDER — ALPRAZOLAM 0.25 MG
1 TABLET ORAL
Qty: 0 | Refills: 0 | DISCHARGE

## 2021-01-30 RX ORDER — MELOXICAM 15 MG/1
1 TABLET ORAL
Qty: 0 | Refills: 0 | DISCHARGE

## 2021-01-30 RX ORDER — NICOTINE POLACRILEX 2 MG
1 GUM BUCCAL
Qty: 30 | Refills: 0
Start: 2021-01-30 | End: 2021-02-28

## 2021-01-30 RX ORDER — CEFUROXIME AXETIL 250 MG
1 TABLET ORAL
Qty: 8 | Refills: 0
Start: 2021-01-30 | End: 2021-02-02

## 2021-01-30 RX ORDER — FOLIC ACID 0.8 MG
1 TABLET ORAL
Qty: 30 | Refills: 0
Start: 2021-01-30 | End: 2021-02-28

## 2021-01-30 RX ADMIN — LIDOCAINE 2 PATCH: 4 CREAM TOPICAL at 00:31

## 2021-01-30 RX ADMIN — Medication 1 PATCH: at 08:09

## 2021-01-30 RX ADMIN — Medication 500 MILLIGRAM(S): at 05:38

## 2021-01-30 RX ADMIN — Medication 1 PATCH: at 12:21

## 2021-01-30 RX ADMIN — Medication 1 PATCH: at 12:59

## 2021-01-30 RX ADMIN — PREGABALIN 1000 MICROGRAM(S): 225 CAPSULE ORAL at 12:21

## 2021-01-30 RX ADMIN — Medication 1 TABLET(S): at 12:21

## 2021-01-30 RX ADMIN — Medication 650 MILLIGRAM(S): at 02:50

## 2021-01-30 RX ADMIN — Medication 1 MILLIGRAM(S): at 12:21

## 2021-01-30 RX ADMIN — Medication 500 MILLIGRAM(S): at 14:00

## 2021-01-30 RX ADMIN — LIDOCAINE 2 PATCH: 4 CREAM TOPICAL at 12:22

## 2021-01-30 RX ADMIN — Medication 25 MILLIGRAM(S): at 12:28

## 2021-01-30 RX ADMIN — ENOXAPARIN SODIUM 40 MILLIGRAM(S): 100 INJECTION SUBCUTANEOUS at 12:21

## 2021-01-30 RX ADMIN — Medication 25 MILLIGRAM(S): at 00:35

## 2021-01-30 NOTE — DISCHARGE NOTE NURSING/CASE MANAGEMENT/SOCIAL WORK - PATIENT PORTAL LINK FT
You can access the FollowMyHealth Patient Portal offered by [facility] by registering at the following website: [email or web address]. By joining [facility]’s FollowMyHealth portal, you will also be able to view your health information using other applications (apps) compatible with our system.

## 2021-01-31 LAB
CULTURE RESULTS: SIGNIFICANT CHANGE UP
CULTURE RESULTS: SIGNIFICANT CHANGE UP
HCV RNA FLD QL NAA+PROBE: SIGNIFICANT CHANGE UP
SPECIMEN SOURCE: SIGNIFICANT CHANGE UP
SPECIMEN SOURCE: SIGNIFICANT CHANGE UP

## 2022-11-15 NOTE — DISCHARGE NOTE PROVIDER - NSDCMRMEDTOKEN_GEN_ALL_CORE_FT
Artificial Tears ophthalmic solution: 1 drop(s) to each affected eye 4 times a day  diclofenac sodium 75 mg oral delayed release tablet: 1 tab(s) orally once a day, As Needed  fluticasone 50 mcg/inh nasal spray: 1 spray(s) nasal once a day  folic acid 1 mg oral tablet: 1 tab(s) orally once a day  hydroCHLOROthiazide 12.5 mg oral capsule: 1 cap(s) orally once a day  ibuprofen 600 mg oral tablet: 1 tab(s) orally every 8 hours, As Needed  ketotifen 0.025% ophthalmic solution: 1 drop(s) to each affected eye every 8 hours  montelukast 10 mg oral tablet: 1 tab(s) orally once a day (at bedtime)  Multiple Vitamins oral tablet: 1 tab(s) orally once a day  thiamine 100 mg oral tablet: 1 tab(s) orally once a day   Xanax 1 mg oral tablet: 1 tab(s) orally once a day No

## 2022-12-08 NOTE — DISCHARGE NOTE PROVIDER - NSDCCPCAREPLAN_GEN_ALL_CORE_FT
PRINCIPAL DISCHARGE DIAGNOSIS  Diagnosis: New onset seizure  Assessment and Plan of Treatment: You came to the hospital after having a seizure episode likely from alcohol withdrawal, you were being monitored on CIWA score and you were seen by nerrologist and recomended to have further liver evaluation for alcohol indcued liver changes, But you wanted to be discharged even though you have not finished all the work up. All risk and benefits were discussed with you and you are being discharged against medical advice. please limit alcohol intake and please go to nearest emergency room if you develop signs of alcohol withdrawal.      SECONDARY DISCHARGE DIAGNOSES  Diagnosis: Alcohol dependence  Assessment and Plan of Treatment: You came to the hospital after having a seizure episode likely from alcohol withdrawal, you were being monitored on CIWA score and you were seen by nerrologist and recomended to have further liver evaluation for alcohol indcued liver changes, But you wanted to be discharged even though you have not finished all the work up. All risk and benefits were discussed with you and you are being discharged against medical advice. please limit alcohol intake and please go to nearest emergency room if you develop signs of alcohol withdrawal.    Diagnosis: Troponin level elevated  Assessment and Plan of Treatment: You were seen by armand and recommended to have echocardiogram. Please follow up with your primary care doctor to have echocardiogram as out patient.
Abdominal Pain, N/V/D

## 2023-09-01 NOTE — H&P ADULT - NSCORESITESY/N_GEN_A_CORE_RD
Goal Outcome Evaluation:       AVSS. Pt weaned from HFNC and Fio2 from 10 Lpm 35% to off. Expiratory wheezes noted x2 assessments, RT notified, neb given x1. Pt tolerated clear liquid diet and advanced to regular diet. IVMF stopped. BM x1. Transferred to unit 6.                   Yes

## 2025-01-22 NOTE — PHYSICAL THERAPY INITIAL EVALUATION ADULT - LEVEL OF INDEPENDENCE: SCOOT/BRIDGE, REHAB EVAL
independent Goal: Pt will improve balance one half grade to improve performance and safety of transfers and ambulation in 2-weeks.

## 2025-02-17 NOTE — ED ADULT TRIAGE NOTE - CCCP TRG CHIEF CMPLNT
Requested Prescriptions     Pending Prescriptions Disp Refills    oxyCODONE HCl (OXY-IR) 10 MG immediate release tablet 120 tablet 0     Sig: Take 1 tablet by mouth every 6 hours as needed for Pain for up to 30 days. Intended supply: 30 days Max Daily Amount: 40 mg       Last Office Visit:  8/21/2024  Next Office Visit:  3/18/2025  Last Medication Refill:  1/22/25  Daniel up to date:  11/19/24    *RX updated to reflect   2/21/25  fill date*   
knee pain/injury